# Patient Record
Sex: FEMALE | Race: WHITE | NOT HISPANIC OR LATINO | Employment: OTHER | URBAN - METROPOLITAN AREA
[De-identification: names, ages, dates, MRNs, and addresses within clinical notes are randomized per-mention and may not be internally consistent; named-entity substitution may affect disease eponyms.]

---

## 2023-04-04 LAB
CREAT ?TM UR-SCNC: 198.2 UMOL/L
EXT ALBUMIN URINE RANDOM: 17.2
HBA1C MFR BLD HPLC: 5.4 %
HCV AB SER-ACNC: NORMAL
MICROALBUMIN/CREAT UR: 9 MG/G{CREAT}

## 2023-11-14 ENCOUNTER — OFFICE VISIT (OUTPATIENT)
Dept: FAMILY MEDICINE CLINIC | Facility: CLINIC | Age: 63
End: 2023-11-14
Payer: MEDICARE

## 2023-11-14 VITALS
OXYGEN SATURATION: 96 % | DIASTOLIC BLOOD PRESSURE: 86 MMHG | TEMPERATURE: 97.5 F | HEIGHT: 64 IN | SYSTOLIC BLOOD PRESSURE: 130 MMHG | RESPIRATION RATE: 16 BRPM | BODY MASS INDEX: 30.08 KG/M2 | HEART RATE: 68 BPM | WEIGHT: 176.2 LBS

## 2023-11-14 DIAGNOSIS — F41.8 DEPRESSION WITH ANXIETY: ICD-10-CM

## 2023-11-14 DIAGNOSIS — R31.29 HEMATURIA, MICROSCOPIC: ICD-10-CM

## 2023-11-14 DIAGNOSIS — E78.5 HYPERLIPIDEMIA, UNSPECIFIED HYPERLIPIDEMIA TYPE: ICD-10-CM

## 2023-11-14 DIAGNOSIS — Z78.0 POSTMENOPAUSAL ESTROGEN DEFICIENCY: ICD-10-CM

## 2023-11-14 DIAGNOSIS — R73.01 ELEVATED FASTING GLUCOSE: ICD-10-CM

## 2023-11-14 DIAGNOSIS — I10 ESSENTIAL HYPERTENSION: Primary | ICD-10-CM

## 2023-11-14 DIAGNOSIS — M54.50 BILATERAL LOW BACK PAIN WITHOUT SCIATICA, UNSPECIFIED CHRONICITY: ICD-10-CM

## 2023-11-14 DIAGNOSIS — M25.561 RIGHT KNEE PAIN, UNSPECIFIED CHRONICITY: ICD-10-CM

## 2023-11-14 DIAGNOSIS — C50.311 MALIGNANT NEOPLASM OF LOWER-INNER QUADRANT OF RIGHT FEMALE BREAST, UNSPECIFIED ESTROGEN RECEPTOR STATUS (HCC): ICD-10-CM

## 2023-11-14 PROCEDURE — 99204 OFFICE O/P NEW MOD 45 MIN: CPT | Performed by: FAMILY MEDICINE

## 2023-11-14 RX ORDER — ESCITALOPRAM OXALATE 10 MG/1
5 TABLET ORAL DAILY
Qty: 15 TABLET
Start: 2023-11-14

## 2023-11-14 RX ORDER — OLMESARTAN MEDOXOMIL 20 MG/1
20 TABLET ORAL DAILY
Qty: 30 TABLET
Start: 2023-11-14

## 2023-11-14 RX ORDER — VALSARTAN AND HYDROCHLOROTHIAZIDE 80; 12.5 MG/1; MG/1
1 TABLET, FILM COATED ORAL DAILY
COMMUNITY
End: 2023-11-14

## 2023-11-14 RX ORDER — BACLOFEN 10 MG/1
10 TABLET ORAL
Qty: 30 TABLET | Refills: 0 | Status: SHIPPED | OUTPATIENT
Start: 2023-11-14

## 2023-11-14 RX ORDER — MULTIVITAMIN
1 TABLET ORAL DAILY
COMMUNITY

## 2023-11-14 RX ORDER — ESCITALOPRAM OXALATE 10 MG/1
10 TABLET ORAL
COMMUNITY
End: 2023-11-14 | Stop reason: SDUPTHER

## 2023-11-14 NOTE — PROGRESS NOTES
Assessment/Plan:    Essential hypertension  - well controlled, continue olmesartan  - olmesartan (BENICAR) 20 mg tablet; Take 1 tablet (20 mg total) by mouth daily  Dispense: 30 tablet  - Comprehensive metabolic panel; Future    Hyperlipidemia  - recent lab results 4/4/23 showed T Chol 210, , discussed life style changes, will recheck lipids  - Lipid Panel with Direct LDL reflex; Future    Depression with anxiety  - patient is doing well on Lexapro 10 mg daily and would like to stay on her current dose  - escitalopram (LEXAPRO) 10 mg tablet; Take 0.5 tablets (5 mg total) by mouth daily  Dispense: 15 tablet    Elevated fasting glucose  - recent A1c 5.4, discussed dietary changes, will recheck labs  - Hemoglobin A1C; Future  - Comprehensive metabolic panel; Future    Malignant neoplasm of lower-inner quadrant of right female breast  - follow up with Breast Surgery    Bilateral low back pain   - recent MRI lumbar spine 8/18/23 showed degenerative disc disease, will refer to PT for evaluation and treatment  - baclofen 10 mg tablet; Take 1 tablet (10 mg total) by mouth daily at bedtime as needed for muscle spasms  Dispense: 30 tablet; Refill: 0  - Ambulatory referral to Physical Therapy; Future    Right knee pain  - will refer to PT and Ortho  - Ambulatory Referral to Orthopedic Surgery; Future  - Ambulatory referral to Physical Therapy; Future    Hematuria, microscopic  - will recheck ua   - UA (URINE) with reflex to Scope         Return in 3 months or sooner as needed. The patient understands and agrees with the treatment plan. Subjective:   Chief Complaint   Patient presents with    [facility]      Patient ID: Jojo Bright is a 70 y.o. female with history of hypertension, hyperlipidemia, depression/ anxiety, breast cancer, s/p right mastectomy in July 2018 who presents today as a new patient to [facility], she recently moved to the area from [place].  Patient reports right knee pain, she was following with Ortho in [place] and would like to discuss a referral to establish care with [facility].          The following portions of the patient's history were reviewed and updated as appropriate: allergies, current medications, past family history, past medical history, past social history, past surgical history and problem list.    Past Medical History:   Diagnosis Date    Arthritis 2020     Past Surgical History:   Procedure Laterality Date    BREAST SURGERY  2018    Right breast     Family History   Problem Relation Age of Onset    Breast cancer Mother     Hypertension Father     Stroke Father      Social History     Socioeconomic History    Marital status: /Civil Union     Spouse name: Not on file    Number of children: Not on file    Years of education: Not on file    Highest education level: Not on file   Occupational History    Not on file   Tobacco Use    Smoking status: Never    Smokeless tobacco: Never   Vaping Use    Vaping Use: Never used   Substance and Sexual Activity    Alcohol use: Not Currently    Drug use: Never    Sexual activity: Yes     Partners: Male   Other Topics Concern    Not on file   Social History Narrative    Not on file     Social Determinants of Health     Financial Resource Strain: Not on file   Food Insecurity: Not on file   Transportation Needs: Not on file   Physical Activity: Not on file   Stress: Not on file   Social Connections: Not on file   Intimate Partner Violence: Not on file   Housing Stability: Not on file       Current Outpatient Medications:     baclofen (LIORESAL) 0.05 MG/ML, , Disp: , Rfl:     escitalopram (LEXAPRO) 10 mg tablet, Take 0.5 tablets (5 mg total) by mouth daily, Disp: 15 tablet, Rfl:     Multiple Vitamin (multivitamin) tablet, Take 1 tablet by mouth daily, Disp: , Rfl:     olmesartan (BENICAR) 20 mg tablet, Take 1 tablet (20 mg total) by mouth daily, Disp: 30 tablet, Rfl:     Review of Systems   Constitutional:  Negative for appetite change, chills, fatigue, fever and unexpected weight change. HENT:  Negative for congestion, rhinorrhea, sore throat and trouble swallowing. Eyes:  Negative for pain, discharge, redness and itching. Respiratory:  Negative for cough, shortness of breath and wheezing. Cardiovascular:  Negative for chest pain, palpitations and leg swelling. Gastrointestinal:  Negative for abdominal pain, blood in stool, constipation, diarrhea, nausea and vomiting. Genitourinary:  Negative for difficulty urinating, dysuria, flank pain, frequency, hematuria, pelvic pain and urgency. Musculoskeletal:  Positive for back pain. Right knee pain   Neurological:  Negative for dizziness, syncope, weakness, numbness and headaches. Hematological:  Negative for adenopathy. Psychiatric/Behavioral:  Negative for dysphoric mood and sleep disturbance. The patient is not nervous/anxious. Objective:    Vitals:    11/14/23 1044   BP: 130/86   Pulse: 68   Resp: 16   Temp: 97.5 °F (36.4 °C)   SpO2: 96%   Weight: 79.9 kg (176 lb 3.2 oz)   Height: 5' 4" (1.626 m)        Physical Exam  Constitutional:       General: She is not in acute distress. Appearance: She is well-developed. HENT:      Right Ear: Tympanic membrane and ear canal normal.      Left Ear: Tympanic membrane and ear canal normal.   Eyes:      Extraocular Movements: Extraocular movements intact. Pupils: Pupils are equal, round, and reactive to light. Neck:      Thyroid: No thyromegaly. Cardiovascular:      Rate and Rhythm: Normal rate and regular rhythm. Heart sounds: Normal heart sounds. No murmur heard. Pulmonary:      Effort: Pulmonary effort is normal. No respiratory distress. Breath sounds: No wheezing, rhonchi or rales. Abdominal:      General: There is no distension. Palpations: Abdomen is soft. There is no mass. Tenderness: There is no abdominal tenderness. Musculoskeletal:      Cervical back: Neck supple. Right lower leg: No edema. Left lower leg: No edema. Lymphadenopathy:      Cervical: No cervical adenopathy. Neurological:      Mental Status: She is alert and oriented to person, place, and time. Psychiatric:         Mood and Affect: Mood normal.         Behavior: Behavior normal.         Thought Content:  Thought content normal.

## 2023-11-14 NOTE — PATIENT INSTRUCTIONS
Knee Exercises   WHAT YOU NEED TO KNOW:   What do I need to know about knee exercises? Knee exercises help strengthen the muscles around your knee. Strong muscles can help reduce pain and decrease your risk of future injury. Knee exercises also help you heal after an injury or surgery. These are beginning exercises. Ask your healthcare provider if you need to see a physical therapist for more advanced exercises. What are some general guidelines for knee exercises? Start slowly. As you get stronger, you may be able to do more sets of each exercise or add weights. Stop if you feel pain. It is normal to feel some discomfort at first, but you should not feel pain. Tell your provider or physical therapist if you have pain while you exercise. Regular exercise will help decrease your discomfort over time. Do the exercises on both legs. Do this so both knees remain strong. Warm up before you do knee exercises. Walk or ride a stationary bike for 5 or 10 minutes to warm your muscles. How do I perform knee stretches safely? Always stretch before you do strengthening exercises. Do these stretching exercises again after you do the strengthening exercises. Do these stretches 4 or 5 days a week, or as directed. Standing calf stretch: Face a wall and place both palms flat on the wall, or hold the back of a chair for balance. Keep a slight bend in your knees. Take a big step backward with one leg. Keep your other leg directly under you. Keep both heels flat and press your hips forward. Hold the stretch for 30 seconds, and then relax for 30 seconds. Switch legs. Repeat 2 or 3 times on each leg. Standing quadriceps stretch:  Stand and place one hand against a wall or hold the back of a chair for balance. With your weight on one leg, bend your other leg and grab your ankle. Bring your heel toward your buttocks. Hold the stretch for 30 to 60 seconds. Switch legs. Repeat 2 or 3 times on each leg. Sitting hamstring stretch:  Sit with both legs straight in front of you. Do not point or flex your toes. Place your palms on the floor and slide your hands forward until you feel the stretch. Do not round your back. Hold the stretch for 30 seconds. Repeat 2 or 3 times. How do I perform knee strengthening exercises safely? Do these exercises 4 or 5 days a week, or as directed. Standing half squats:  Stand with your feet shoulder-width apart. Lean your back against a wall or hold the back of a chair for balance, if needed. Slowly sit down about 10 inches, as if you are going to sit in a chair. Your body weight should be mostly over your heels. Hold the squat for 5 seconds, then rise to a standing position. Do 3 sets of 10 squats to strengthen your buttocks and thighs. Standing hamstring curls: Face a wall and place both palms flat on the wall, or hold the back of a chair for balance. With your weight on one leg, lift your other foot as close to your buttocks as you can. Hold for 5 seconds and then lower your leg. Do 2 sets of 10 curls on each leg. This exercise strengthens the muscles in the back of your thigh. Standing calf raises:  Face a wall and place both palms flat on the wall, or hold the back of a chair for balance. Stand up straight, and do not lean. Place all your weight on one leg by lifting the other foot off the floor. Raise the heel of the foot that is on the floor as high as you can and then lower it. Do 2 sets of 10 calf raises on each leg to strengthen your calf muscles. Straight leg lifts:  Lie on your stomach with straight legs. Fold your arms in front of you and rest your head in your arms. Tighten your leg muscles and raise one leg as high as you can. Hold for 5 seconds, then lower your leg. Do 2 sets of 10 lifts on each leg to strengthen your buttocks. Sitting leg lifts:  Sit in a chair. Slowly straighten and raise one leg.  Squeeze your thigh muscles and hold for 5 seconds. Relax and return your foot to the floor. Do 2 sets of 10 lifts on each leg. This helps strengthen the muscles in the front of your thigh. When should I call my doctor or physical therapist?   You have new pain or your pain becomes worse. You have questions or concerns about your condition or care. CARE AGREEMENT:   You have the right to help plan your care. Learn about your health condition and how it may be treated. Discuss treatment options with your healthcare providers to decide what care you want to receive. You always have the right to refuse treatment. The above information is an  only. It is not intended as medical advice for individual conditions or treatments. Talk to your doctor, nurse or pharmacist before following any medical regimen to see if it is safe and effective for you. © Copyright [facility] 2023 Information is for End User's use only and may not be sold, redistributed or otherwise used for commercial purposes.

## 2023-12-08 ENCOUNTER — OFFICE VISIT (OUTPATIENT)
Dept: OBGYN CLINIC | Facility: CLINIC | Age: 63
End: 2023-12-08
Payer: MEDICARE

## 2023-12-08 ENCOUNTER — APPOINTMENT (OUTPATIENT)
Dept: RADIOLOGY | Facility: CLINIC | Age: 63
End: 2023-12-08
Payer: MEDICARE

## 2023-12-08 VITALS
HEIGHT: 64 IN | DIASTOLIC BLOOD PRESSURE: 79 MMHG | HEART RATE: 80 BPM | WEIGHT: 173 LBS | BODY MASS INDEX: 29.53 KG/M2 | SYSTOLIC BLOOD PRESSURE: 140 MMHG

## 2023-12-08 DIAGNOSIS — Z01.89 ENCOUNTER FOR LOWER EXTREMITY COMPARISON IMAGING STUDY: ICD-10-CM

## 2023-12-08 DIAGNOSIS — M17.31 POST-TRAUMATIC OSTEOARTHRITIS OF RIGHT KNEE: Primary | ICD-10-CM

## 2023-12-08 DIAGNOSIS — S83.241A TEAR OF MEDIAL MENISCUS OF RIGHT KNEE, CURRENT, UNSPECIFIED TEAR TYPE, INITIAL ENCOUNTER: ICD-10-CM

## 2023-12-08 DIAGNOSIS — M25.561 RIGHT KNEE PAIN, UNSPECIFIED CHRONICITY: ICD-10-CM

## 2023-12-08 PROCEDURE — 77073 BONE LENGTH STUDIES: CPT

## 2023-12-08 PROCEDURE — 20610 DRAIN/INJ JOINT/BURSA W/O US: CPT | Performed by: STUDENT IN AN ORGANIZED HEALTH CARE EDUCATION/TRAINING PROGRAM

## 2023-12-08 PROCEDURE — 73564 X-RAY EXAM KNEE 4 OR MORE: CPT

## 2023-12-08 PROCEDURE — 73560 X-RAY EXAM OF KNEE 1 OR 2: CPT

## 2023-12-08 PROCEDURE — 99204 OFFICE O/P NEW MOD 45 MIN: CPT | Performed by: STUDENT IN AN ORGANIZED HEALTH CARE EDUCATION/TRAINING PROGRAM

## 2023-12-08 RX ORDER — LIDOCAINE HYDROCHLORIDE 10 MG/ML
4 INJECTION, SOLUTION INFILTRATION; PERINEURAL
Status: COMPLETED | OUTPATIENT
Start: 2023-12-08 | End: 2023-12-08

## 2023-12-08 RX ORDER — TRIAMCINOLONE ACETONIDE 40 MG/ML
40 INJECTION, SUSPENSION INTRA-ARTICULAR; INTRAMUSCULAR
Status: COMPLETED | OUTPATIENT
Start: 2023-12-08 | End: 2023-12-08

## 2023-12-08 RX ADMIN — LIDOCAINE HYDROCHLORIDE 4 ML: 10 INJECTION, SOLUTION INFILTRATION; PERINEURAL at 09:00

## 2023-12-08 RX ADMIN — TRIAMCINOLONE ACETONIDE 40 MG: 40 INJECTION, SUSPENSION INTRA-ARTICULAR; INTRAMUSCULAR at 09:00

## 2023-12-08 NOTE — PROGRESS NOTES
Knee New Office Note    Assessment:     1. Post-traumatic osteoarthritis of right knee    2. Right knee pain, unspecified chronicity    3. Encounter for lower extremity comparison imaging study    4. Tear of medial meniscus of right knee, current, unspecified tear type, initial encounter        Plan:     Problem List Items Addressed This Visit    None  Visit Diagnoses       Post-traumatic osteoarthritis of right knee    -  Primary    Relevant Orders    Ambulatory Referral to Physical Therapy    Large joint arthrocentesis    Right knee pain, unspecified chronicity        Relevant Orders    XR knee 4+ vw right injury    XR bone length (scanogram)    Encounter for lower extremity comparison imaging study        Relevant Orders    XR knee 1 or 2 vw left    XR bone length (scanogram)    Tear of medial meniscus of right knee, current, unspecified tear type, initial encounter        Relevant Orders    Ambulatory Referral to Physical Therapy    Large joint arthrocentesis             64-year-old female presenting for evaluation of chronic right knee pain with history, clinical exam and radiographs consistent with posttraumatic osteoarthritis of the right knee. Treatment options were reviewed and discussed with the patient which include conservative and surgical modalities. Since she has not had any treatment of her right knee in the past, recommend starting with conservative measures with over-the-counter anti-inflammatories, prescription for physical therapy and right knee intra-articular corticosteroid injection. Cortisone steroid injection was administered today. Patient should avoid strenuous activities for the next 1-2 days. Patient should avoid vaccines for the next 2 weeks if possible. If patient is diabetic, should monitor glucose levels for the next 7 to 10 days. Can apply cold compress for soreness. If patient feels relief with the cortisone injections, procedure can be repeated every 3 months.  If patient sees minimal/no relief with cortisone injection, treatment with VISCO injections can be further discussed. She can follow-up as needed moving forward if her symptoms persist or fail to improve. Subjective:     Patient ID: Goldie Hill is a 70 y.o. female. Chief Complaint:  HPI:  70 y.o. female community ambulator presents for evaluation of chronic right knee pain. Patient sustained a fall onto her right knee a couple years ago. After the fall she had swelling and pain over the medial aspect of her right knee. This resolved after the injury with activity modification and over-the-counter anti-inflammatories. Over the last couple years she has had intermittent medial sided right knee pain that is worse with activity and relieved by rest.  Pain is 5 out of 10 in severity. She describes her pain as a dull achy sensation. Denies radiation or tingling distally. She has not had any treatment for her right knee pain. She had an MRI done in August of the right knee showing medial meniscus tear. Denies any mechanical blocks with knee flexion and extension.       Allergy:  Allergies   Allergen Reactions    Sulfa Antibiotics Other (See Comments)     Felt something wrong with her face     Medications:  all current active meds have been reviewed  Past Medical History:  Past Medical History:   Diagnosis Date    Arthritis 2020     Past Surgical History:  Past Surgical History:   Procedure Laterality Date    BREAST SURGERY  2018    Right breast     Family History:  Family History   Problem Relation Age of Onset    Breast cancer Mother     Hypertension Father     Stroke Father      Social History:  Social History     Substance and Sexual Activity   Alcohol Use Not Currently     Social History     Substance and Sexual Activity   Drug Use Never     Social History     Tobacco Use   Smoking Status Never   Smokeless Tobacco Never           ROS:  General: Per HPI  Skin: Negative, except if noted below  HEENT: Negative  Respiratory: Negative  Cardiovascular: Negative  Gastrointestinal: Negative  Urinary: Negative  Vascular: Negative  Musculoskeletal: Positive per HPI   Neurologic: Positive per HPI  Endocrine: Negative    Objective:  BP Readings from Last 1 Encounters:   12/08/23 140/79      Wt Readings from Last 1 Encounters:   12/08/23 78.5 kg (173 lb)        Respiratory:   non-labored respirations    Lymphatics:  no palpable lymph nodes    Gait:   Antalgic     Neurologic:   Alert and oriented times 3  Patient with normal sensation except as noted below  Deep tendon reflexes 2+ except as noted in MSK exam    Bilateral Lower Extremity:  Right knee: Inspection:  skin intact    Overall limb alignment varus    Effusion: negative    ROM 5-95 deg flexion with pain    Extensor Lag: negative    Palpation: medial Joint line tenderness to palpation    AP Stability at 90 deg stable    M/L stability in full extension stable    M/L stability in midflexion stable    Motor: 5/5 Q/HS/TA/GS/P    Pulses: 2+ DP / 2+ PT    SILT DP/SP/S/S/TN    Imaging:  My interpretation XR AP scanogram/AP bilateral knee/lateral/lopez/sunrise right knee: moderate medial joint space narrowing, subchondral sclerosis, subchondral cysts, osteophyte formation. No fracture or dislocation. MRI right knee shows medial meniscus tear     Large joint arthrocentesis: R knee  Universal Protocol:  Consent: Verbal consent obtained.   Risks and benefits: risks, benefits and alternatives were discussed  Consent given by: patient  Patient identity confirmed: verbally with patient  Supporting Documentation  Indications: pain   Procedure Details  Location: knee - R knee  Preparation: Patient was prepped and draped in the usual sterile fashion  Needle size: 22 G  Ultrasound guidance: no  Approach: anterolateral  Medications administered: 40 mg triamcinolone acetonide 40 mg/mL; 4 mL lidocaine 1 %    Patient tolerance: patient tolerated the procedure well with no immediate complications  Dressing:  Sterile dressing applied          BMI:   Estimated body mass index is 29.7 kg/m² as calculated from the following:    Height as of this encounter: 5' 4" (1.626 m). Weight as of this encounter: 78.5 kg (173 lb). BSA:   Estimated body surface area is 1.84 meters squared as calculated from the following:    Height as of this encounter: 5' 4" (1.626 m). Weight as of this encounter: 78.5 kg (173 lb).

## 2023-12-14 ENCOUNTER — EVALUATION (OUTPATIENT)
Dept: PHYSICAL THERAPY | Facility: CLINIC | Age: 63
End: 2023-12-14
Payer: MEDICARE

## 2023-12-14 DIAGNOSIS — S83.241D TEAR OF MEDIAL MENISCUS OF RIGHT KNEE, CURRENT, UNSPECIFIED TEAR TYPE, SUBSEQUENT ENCOUNTER: ICD-10-CM

## 2023-12-14 DIAGNOSIS — M17.31 POST-TRAUMATIC OSTEOARTHRITIS OF RIGHT KNEE: ICD-10-CM

## 2023-12-14 PROCEDURE — 97110 THERAPEUTIC EXERCISES: CPT | Performed by: PHYSICAL THERAPIST

## 2023-12-14 PROCEDURE — 97162 PT EVAL MOD COMPLEX 30 MIN: CPT | Performed by: PHYSICAL THERAPIST

## 2023-12-14 NOTE — PROGRESS NOTES
PT Evaluation     Today's date: 2023  Patient name: Gladys Montalvo  : 1960  MRN: [id number]  Referring provider: Bunny Rueda, *  Dx:   Encounter Diagnosis     ICD-10-CM    1. Post-traumatic osteoarthritis of right knee  M17.31 Ambulatory Referral to Physical Therapy      2. Tear of medial meniscus of right knee, current, unspecified tear type, subsequent encounter  S83.434D Ambulatory Referral to Physical Therapy                     Assessment  Assessment details: Gladys Montalvo is a 70 y.o. female presenting to outpatient physical therapy with chief complaints of (R) knee pain. Patient describes insidious onset of pain in May which has impacted her ability to ambulate normally. Patient displays with abnormal gait, restricted (R) knee mobility, poor functional strength, significant (B) hip strength deficits, low confidence with functional tasks, and functional restrictions. Patient's symptoms are multifactorial in nature with a primary movement diagnosis of poor motor control resulting in pathoanatomical signs and symptoms consistent with Post-traumatic osteoarthritis of right knee, Tear of medial meniscus of right knee, current, unspecified tear type, subsequent encounter resulting in limitations in her ability to ambulate safely and perform daily tasks without thinking about her knee. Please contact me if you have any questions ([phone]). Thank you for the opportunity to share in the care of this patient. Impairments: abnormal gait, abnormal or restricted ROM, activity intolerance, impaired balance, impaired physical strength, lacks appropriate home exercise program and pain with function    Symptom irritability: highUnderstanding of Dx/Px/POC: good   Prognosis: fair  Prognosis details: Positive prognostic indicators include positive attitude toward recovery, motivated to improve, good understanding of condition, realistic expectations.   Negative prognostic indicators include chronicity of symptoms, high symptom irritability. Goals  STG to be met in 4 weeks:  - Increase (R) Knee AROM: 0-125 degrees. - Increase (B) Hip strength by 1/2 MMT grade. - Improve SL balance to 5 seconds. - I with HEP.  - Patient will be able to walk 1/8 mile without pain. LTG to be met in 8 weeks:  - Increase (R) Knee AROM: 0-130 degrees. - Increase (R) Hip strength to 4+/5 all planes. - Improve SL balance to 15 seconds. - I with updated HEP.  - Patient will be able to perform all ADLs without thinking about her knee. Plan  Plan details: Prognosis above is given PT services 2x/week tapering to 1x/week over the next 8 weeks and home program adherence. Patient would benefit from: skilled physical therapy  Planned modality interventions: cryotherapy and thermotherapy: hydrocollator packs  Planned therapy interventions: joint mobilization, manual therapy, neuromuscular re-education, patient education, strengthening, stretching, therapeutic activities, therapeutic exercise, home exercise program, body mechanics training, activity modification, functional ROM exercises, gait training and balance  Plan of Care beginning date: 12/14/2023  Plan of Care expiration date: 2/8/2024  Treatment plan discussed with: patient        Subjective Evaluation    History of Present Illness  Mechanism of injury: At Evaluation (December 14, 2023): Patient reports insidious onset of (R) sided lower back and (R) knee pain in May. She was attending PT in [facility] for her lower back - her strength improved but pain persisted. She underwent lumbar and (R) knee MRIs which showed fraying of the meniscus in the (R) knee. She recently moved to this area - established care with Dr. Otoniel Molina - recommended cortisone injection and PT.      Red Flag Screen:  Patient denies recent fever, changes in bowel and bladder function, nausea and vomiting, weakness, unexplained weight loss, tingling, numbness, pain with coughing, or traumatic LILIANA. Greatest concern: lack of mobility     Quality of life: good    Patient Goals  Patient goal: Patient Specific Functional Scale: return to walking 1/4 without compensations 0/10, able to negotiate curbs without pain 0/10, function without worrying about the knee 0/10; Total 0/30  Pain  Current pain ratin  At best pain ratin  At worst pain ratin  Location: (R) knee - anterior and lateral  Quality: pressure, dull ache and sharp  Alleviating factors: Activity modification, gentle movement. Exacerbated by: walking longer distances, weather changes, walking on uneven surfaces, step wthout handrail. Social Support  Steps to enter house: no  Stairs in house: no   [place] [facility]  Lives with: spouse    Employment status: not working    Diagnostic Tests  X-ray: normal  MRI studies: abnormal  Treatments  Previous treatment: physical therapy  Current treatment: injection treatment        Objective     Static Posture   General Observations  Asymmetrical weight bearing and shifted left. Palpation     Additional Palpation Details  TTP lateral (R) joint line    Active Range of Motion   Left Knee   Flexion: 130 degrees   Extension: 0 degrees     Right Knee   Flexion: 120 degrees   Extension: 0 degrees     Strength/Myotome Testing     Left Hip   Planes of Motion   Extension: 3  Abduction: 3    Right Hip   Planes of Motion   Extension: 3  Abduction: 3    Left Knee   Flexion: 5  Extension: 5    Right Knee   Flexion: 4-  Extension: 4+    Tests     Left Knee   Negative anterior drawer, posterior drawer, valgus stress test at 0 degrees, valgus stress test at 30 degrees, varus stress test at 0 degrees and varus stress test at 30 degrees. Right Knee   Negative anterior drawer, lateral Evin, medial Evin, posterior drawer, valgus stress test at 0 degrees, valgus stress test at 30 degrees, varus stress test at 0 degrees and varus stress test at 30 degrees.      Ambulation Observational Gait   Gait: antalgic   Decreased walking speed, right stance time and right step length. Right foot contact pattern: foot flat    Functional Assessment        Comments  Squat: poor hip control and depth             Daily Treatment Diary     DX: (R) Knee OA - meniscus tear  EPOC: 2/8/24  Follow Up with Referring Provider:   Precautions: NA  CO-MORBIDITIES: HTN  HEP ACCESS CODE: [id number]     Stage: chronic   Stability of Symptoms:  At Evaluation: stable - unchanged  Global Rating of Change:   Symptom Irritability Level: high  Primary Movement Diagnosis: poor motor control  Primary Pain Phenotype: nociplastic  Patient Specific Functional Scale:   12/14/23: return to walking 1/4 without compensations 0/10, able to negotiate curbs without pain 0/10, function without worrying about the knee 0/10;  Total 0/30   FOTO Prediction: 64 by visit 12  FOTO Progress: 50 at evaluation   Greatest Concern: lack of mobility  Current Activity Plan: added to HEP (12/14)  Current Educational Needs: progressions, confidence      Treatment Diary          Manual Therapy         (R) Knee PROM                                                               Therapeutic Exercise  HEP         Recumbent Bike          Elliptical                     SLR 12/14         S/L Hip ABD 12/14         Bridge 12/14                   LAQ                                        Neuromuscular Reeducation          Foam Side Stepping                    FWD Mini Lunge          LAT Mini Lunge          Mini Squat                    FWD Step Up          LAT Step Up                    TB Counter Balance                    Therapeutic Activity                              Gait Training                                        Modalities

## 2023-12-21 ENCOUNTER — OFFICE VISIT (OUTPATIENT)
Dept: PHYSICAL THERAPY | Facility: CLINIC | Age: 63
End: 2023-12-21
Payer: MEDICARE

## 2023-12-21 DIAGNOSIS — S83.241D TEAR OF MEDIAL MENISCUS OF RIGHT KNEE, CURRENT, UNSPECIFIED TEAR TYPE, SUBSEQUENT ENCOUNTER: ICD-10-CM

## 2023-12-21 DIAGNOSIS — M17.31 POST-TRAUMATIC OSTEOARTHRITIS OF RIGHT KNEE: Primary | ICD-10-CM

## 2023-12-21 PROCEDURE — 97110 THERAPEUTIC EXERCISES: CPT | Performed by: PHYSICAL THERAPIST

## 2023-12-21 PROCEDURE — 97112 NEUROMUSCULAR REEDUCATION: CPT | Performed by: PHYSICAL THERAPIST

## 2023-12-21 NOTE — PROGRESS NOTES
Daily Note     Today's date: 2023  Patient name: Analisa Reyez  : 1960  MRN: [id number]  Referring provider: Faizan Garcia, *  Dx:   Encounter Diagnosis     ICD-10-CM    1. Post-traumatic osteoarthritis of right knee  M17.31       2. Tear of medial meniscus of right knee, current, unspecified tear type, subsequent encounter  S83.245Z                      Subjective: Patient reports no adverse reaction to her IE.  HEP is going well - she notes she is having some increased muscular soreness in her hip.          Objective: See treatment diary below      Assessment:   Stage: chronic   Stability of Symptoms:  At Evaluation: stable - unchanged  Global Rating of Change:   Symptom Irritability Level: high  Primary Movement Diagnosis: poor motor control  Primary Pain Phenotype: nociplastic  Patient Specific Functional Scale:   23: return to walking 1/4 without compensations 0/10, able to negotiate curbs without pain 0/10, function without worrying about the knee 0/10; Total 0/30   FOTO Prediction: 64 by visit 12  FOTO Progress: 50 at evaluation   Greatest Concern: lack of mobility  Current Activity Plan: added to HEP ()  Current Educational Needs: progressions, confidence    Patient had good tolerance to manual treatment - knee mobility improved following.  She demonstrated good exercise form throughout treatment.  She was encouraged to not only identify problem areas but also identify things that are going well or ways that she is making progress with things.  She was able to walk with step through pattern but remains hesitant to do so without being asked. Skilled PT continues to be required to guide progression of knee and hip control and strength to allow her to have more confidence with walking and negotiating steps.      Plan: Continue with POC - monitor tolerance to treatment - progress as able NV       Daily Treatment Diary     DX: (R) Knee OA - meniscus tear  EPOC:  "2/8/24  Follow Up with Referring Provider:   Precautions: NA  CO-MORBIDITIES: HTN  HEP ACCESS CODE: [id number]       Treatment Diary  12/21        Manual Therapy         (R) Knee PROM Seated  8 min                                                              Therapeutic Exercise  HEP         Recumbent Bike  6 min        Elliptical                     SLR 12/14 15x ea        S/L Hip ABD 12/14         Bridge 12/14 5\"x20                  LAQ  2# 15x ea                                      Neuromuscular Reeducation          Foam Side Stepping  6 Feet  3 laps                  FWD Mini Lunge  15x ea        LAT Mini Lunge          Mini Squat                    FWD Step Up          LAT Step Up                    TB Counter Balance                    Therapeutic Activity                              Gait Training                                        Modalities                                      "

## 2023-12-26 ENCOUNTER — OFFICE VISIT (OUTPATIENT)
Dept: PHYSICAL THERAPY | Facility: CLINIC | Age: 63
End: 2023-12-26
Payer: MEDICARE

## 2023-12-26 DIAGNOSIS — S83.241D TEAR OF MEDIAL MENISCUS OF RIGHT KNEE, CURRENT, UNSPECIFIED TEAR TYPE, SUBSEQUENT ENCOUNTER: ICD-10-CM

## 2023-12-26 DIAGNOSIS — M17.31 POST-TRAUMATIC OSTEOARTHRITIS OF RIGHT KNEE: Primary | ICD-10-CM

## 2023-12-26 PROCEDURE — 97110 THERAPEUTIC EXERCISES: CPT | Performed by: PHYSICAL THERAPIST

## 2023-12-26 PROCEDURE — 97112 NEUROMUSCULAR REEDUCATION: CPT | Performed by: PHYSICAL THERAPIST

## 2023-12-26 NOTE — PROGRESS NOTES
Daily Note     Today's date: 2023  Patient name: Analisa Reyez  : 1960  MRN: [id number]  Referring provider: Faizan Garcia, *  Dx:   Encounter Diagnosis     ICD-10-CM    1. Post-traumatic osteoarthritis of right knee  M17.31       2. Tear of medial meniscus of right knee, current, unspecified tear type, subsequent encounter  S83.024U                      Subjective: Patient reports good tolerance to her LV.  She notes she is experiencing (R) hip clicking with walking.  Today she reports she is very tired from the holiday.            Objective: See treatment diary below      Assessment:   Stage: chronic   Stability of Symptoms:  At Evaluation: stable - unchanged  Global Rating of Change:   Symptom Irritability Level: high  Primary Movement Diagnosis: poor motor control  Primary Pain Phenotype: nociplastic  Patient Specific Functional Scale:   23: return to walking 1/4 without compensations 0/10, able to negotiate curbs without pain 0/10, function without worrying about the knee 0/10; Total 0/30   FOTO Prediction: 64 by visit 12  FOTO Progress: 50 at evaluation   Greatest Concern: lack of mobility  Current Activity Plan: added to HEP ()  Current Educational Needs: progressions, confidence    Patient continues to respond well to manual treatment.  She was fatigued with lunges but noted no increased pain throughout treatment.  She appeared more confident with ambulation post treatment.  Skilled PT continues to be required to guide progression of knee and hip control and strength to allow her to have more confidence with walking and negotiating steps.      Plan: Continue with POC - monitor tolerance to treatment - progress as able NV       Daily Treatment Diary     DX: (R) Knee OA - meniscus tear  EPOC: 24  Follow Up with Referring Provider:   Precautions: NA  CO-MORBIDITIES: HTN  HEP ACCESS CODE: [id number]       Treatment Diary         Manual Therapy         (R) Knee  "PROM Seated  8 min Seated  8 min                                                             Therapeutic Exercise  HEP         Recumbent Bike  6 min 6 min       Elliptical                     SLR 12/14 15x ea 20x ea       S/L Hip ABD 12/14  10x ea       Bridge 12/14 5\"x20 5\"x20                 LAQ  2# 15x ea 3# 20x ea                                     Neuromuscular Reeducation          Foam Side Stepping  6 Feet  3 laps                  FWD Mini Lunge  15x ea 15x ea       LAT Mini Lunge   15x ea       Mini Squat   20x                 FWD Step Up          LAT Step Up                    TB Counter Balance                    Therapeutic Activity                              Gait Training                                        Modalities                                      "

## 2023-12-28 ENCOUNTER — OFFICE VISIT (OUTPATIENT)
Dept: PHYSICAL THERAPY | Facility: CLINIC | Age: 63
End: 2023-12-28
Payer: MEDICARE

## 2023-12-28 DIAGNOSIS — M17.31 POST-TRAUMATIC OSTEOARTHRITIS OF RIGHT KNEE: Primary | ICD-10-CM

## 2023-12-28 DIAGNOSIS — S83.241D TEAR OF MEDIAL MENISCUS OF RIGHT KNEE, CURRENT, UNSPECIFIED TEAR TYPE, SUBSEQUENT ENCOUNTER: ICD-10-CM

## 2023-12-28 PROCEDURE — 97112 NEUROMUSCULAR REEDUCATION: CPT | Performed by: PHYSICAL THERAPIST

## 2023-12-28 PROCEDURE — 97110 THERAPEUTIC EXERCISES: CPT | Performed by: PHYSICAL THERAPIST

## 2023-12-28 NOTE — PROGRESS NOTES
Daily Note     Today's date: 2023  Patient name: Analisa Reyez  : 1960  MRN: [id number]  Referring provider: Faizan Garcia, *  Dx:   Encounter Diagnosis     ICD-10-CM    1. Post-traumatic osteoarthritis of right knee  M17.31       2. Tear of medial meniscus of right knee, current, unspecified tear type, subsequent encounter  S83.453R                      Subjective: Patient reports no adverse reaction to her LV - felt very good that night and the next day.  She reports some hip and knee discomfort this morning.        Objective: See treatment diary below      Assessment:   Stage: chronic   Stability of Symptoms:  At Evaluation: stable - unchanged  Global Rating of Change:   Symptom Irritability Level: high  Primary Movement Diagnosis: poor motor control  Primary Pain Phenotype: nociplastic  Patient Specific Functional Scale:   23: return to walking 1/4 without compensations 0/10, able to negotiate curbs without pain 0/10, function without worrying about the knee 0/10; Total 0/30   FOTO Prediction: 64 by visit 12  FOTO Progress: 50 at evaluation   Greatest Concern: lack of mobility  Current Activity Plan: added to HEP ()  Current Educational Needs: progressions, confidence    Patient continues to respond well to manual treatment - ROM is improving well.  She demonstrated improved form with lunges today.  She was fatigued post treatment but noted no increased pain.  Skilled PT continues to be required to guide progression of knee and hip control and strength to allow her to have more confidence with walking and negotiating steps.      Plan: Continue with POC - monitor tolerance to treatment - progress as able NV       Daily Treatment Diary     DX: (R) Knee OA - meniscus tear  EPOC: 24  Follow Up with Referring Provider:   Precautions: NA  CO-MORBIDITIES: HTN  HEP ACCESS CODE: [id number]       Treatment Diary        Manual Therapy         (R) Knee PROM Seated  8  "min Seated  8 min Seated  8 min                                                            Therapeutic Exercise  HEP         Recumbent Bike  6 min 6 min 6 min      Elliptical                     SLR 12/14 15x ea 20x ea 20x ea      S/L Hip ABD 12/14  10x ea 20x ea      Bridge 12/14 5\"x20 5\"x20 5\"x20                LAQ  2# 15x ea 3# 20x ea 4# 20x ea                                    Neuromuscular Reeducation          Foam Side Stepping  6 Feet  3 laps                  FWD Mini Lunge  15x ea 15x ea 15x ea      LAT Mini Lunge   15x ea 15x ea      Mini Squat   20x 20x                FWD Step Up          LAT Step Up                    TB Counter Balance                    Therapeutic Activity                              Gait Training                                        Modalities                                      "

## 2024-01-02 ENCOUNTER — OFFICE VISIT (OUTPATIENT)
Dept: PHYSICAL THERAPY | Facility: CLINIC | Age: 64
End: 2024-01-02
Payer: MEDICARE

## 2024-01-02 DIAGNOSIS — M17.31 POST-TRAUMATIC OSTEOARTHRITIS OF RIGHT KNEE: Primary | ICD-10-CM

## 2024-01-02 DIAGNOSIS — S83.241D TEAR OF MEDIAL MENISCUS OF RIGHT KNEE, CURRENT, UNSPECIFIED TEAR TYPE, SUBSEQUENT ENCOUNTER: ICD-10-CM

## 2024-01-02 PROCEDURE — 97110 THERAPEUTIC EXERCISES: CPT | Performed by: PHYSICAL THERAPIST

## 2024-01-02 PROCEDURE — 97112 NEUROMUSCULAR REEDUCATION: CPT | Performed by: PHYSICAL THERAPIST

## 2024-01-02 NOTE — PROGRESS NOTES
Daily Note     Today's date: 2024  Patient name: Analisa Reyez  : 1960  MRN: [id number]  Referring provider: Faizan Garcia, *  Dx:   Encounter Diagnosis     ICD-10-CM    1. Post-traumatic osteoarthritis of right knee  M17.31       2. Tear of medial meniscus of right knee, current, unspecified tear type, subsequent encounter  S83.097F                      Subjective: Patient reports good tolerance to her LV.  She notes she is feeling a little off today with equilibrium.  She notes her HEP is helping her feel better - yesterday she did a lot of house work instead of HEP.        Objective: See treatment diary below      Assessment:   Stage: chronic   Stability of Symptoms:  At Evaluation: stable - unchanged  Global Rating of Change:   Symptom Irritability Level: high  Primary Movement Diagnosis: poor motor control  Primary Pain Phenotype: nociplastic  Patient Specific Functional Scale:   23: return to walking 1/4 without compensations 0/10, able to negotiate curbs without pain 0/10, function without worrying about the knee 0/10; Total 0/30   FOTO Prediction: 64 by visit 12  FOTO Progress: 50 at evaluation   Greatest Concern: lack of mobility  Current Activity Plan: added to HEP ()  Current Educational Needs: progressions, confidence    Patient continues to have good tolerance to manual treatment.  She demonstrated good form and confidence with exercises today.  She continues to make slow progress with PT.  Skilled PT continues to be required to guide progression of knee and hip control and strength to allow her to have more confidence with walking and negotiating steps.      Plan: Continue with POC - monitor tolerance to treatment - progress as able NV       Daily Treatment Diary     DX: (R) Knee OA - meniscus tear  EPOC: 24  Follow Up with Referring Provider:   Precautions: NA  CO-MORBIDITIES: HTN  HEP ACCESS CODE: [id number]       Treatment Diary      "  Manual Therapy         (R) Knee PROM Seated  8 min Seated  8 min Seated  8 min Seated   8 min                                                           Therapeutic Exercise  HEP         Recumbent Bike  6 min 6 min 6 min 6 min     Elliptical                     SLR 12/14 15x ea 20x ea 20x ea 20x ea     S/L Hip ABD 12/14  10x ea 20x ea 20x ea     Bridge 12/14 5\"x20 5\"x20 5\"x20 5\"x20               LAQ  2# 15x ea 3# 20x ea 4# 20x ea 5# 20x ea                                   Neuromuscular Reeducation          Foam Side Stepping  6 Feet  3 laps                  FWD Mini Lunge  15x ea 15x ea 15x ea 15x ea     LAT Mini Lunge   15x ea 15x ea 15x ea     Mini Squat   20x 20x 20x               FWD Step Up          LAT Step Up                    TB Counter Balance                    Therapeutic Activity                              Gait Training                                        Modalities                                      "

## 2024-01-05 ENCOUNTER — APPOINTMENT (OUTPATIENT)
Dept: PHYSICAL THERAPY | Facility: CLINIC | Age: 64
End: 2024-01-05
Payer: MEDICARE

## 2024-01-10 ENCOUNTER — OFFICE VISIT (OUTPATIENT)
Dept: PHYSICAL THERAPY | Facility: CLINIC | Age: 64
End: 2024-01-10
Payer: MEDICARE

## 2024-01-10 DIAGNOSIS — S83.241D TEAR OF MEDIAL MENISCUS OF RIGHT KNEE, CURRENT, UNSPECIFIED TEAR TYPE, SUBSEQUENT ENCOUNTER: ICD-10-CM

## 2024-01-10 DIAGNOSIS — M17.31 POST-TRAUMATIC OSTEOARTHRITIS OF RIGHT KNEE: Primary | ICD-10-CM

## 2024-01-10 PROCEDURE — 97112 NEUROMUSCULAR REEDUCATION: CPT | Performed by: PHYSICAL THERAPIST

## 2024-01-10 PROCEDURE — 97110 THERAPEUTIC EXERCISES: CPT | Performed by: PHYSICAL THERAPIST

## 2024-01-10 NOTE — PROGRESS NOTES
Daily Note     Today's date: 1/10/2024  Patient name: Analisa Reyez  : 1960  MRN: [id number]  Referring provider: Faizan Garcia, *  Dx:   Encounter Diagnosis     ICD-10-CM    1. Post-traumatic osteoarthritis of right knee  M17.31       2. Tear of medial meniscus of right knee, current, unspecified tear type, subsequent encounter  S83.495U                      Subjective: Patient reports good tolerance to her LV.   She reports she is still having difficulty with negotiating curbs.        Objective: See treatment diary below      Assessment:   Stage: chronic   Stability of Symptoms:  At Evaluation: stable - unchanged  Global Rating of Change:   Symptom Irritability Level: high  Primary Movement Diagnosis: poor motor control  Primary Pain Phenotype: nociplastic  Patient Specific Functional Scale:   23: return to walking 1/4 without compensations 0/10, able to negotiate curbs without pain 0/10, function without worrying about the knee 0/10; Total 0/30   FOTO Prediction: 64 by visit 12  FOTO Progress: 50 at evaluation   Greatest Concern: lack of mobility  Current Activity Plan: added to HEP ()  Current Educational Needs: progressions, confidence    Patient continues to respond well to manual treatment.  She is making progress with confidence in her knee.  She was able to progress to step ups with good form.  She is making steady progress with PT.  She noted fatigue but no pain post treatment.  Skilled PT continues to be required to guide progression of knee and hip control and strength to allow her to have more confidence with walking and negotiating steps.      Plan: Continue with POC - monitor tolerance to treatment - progress as able NV. Add dual task exercises to assess response       Daily Treatment Diary     DX: (R) Knee OA - meniscus tear  EPOC: 24  Follow Up with Referring Provider:   Precautions: NA  CO-MORBIDITIES: HTN  HEP ACCESS CODE: [id number]       Treatment Diary    "12/26 12/28 1/2 1/10    Manual Therapy         (R) Knee PROM Seated  8 min Seated  8 min Seated  8 min Seated   8 min Seated   8 min                                                          Therapeutic Exercise  HEP         Recumbent Bike  6 min 6 min 6 min 6 min 6 min    Elliptical                     SLR 12/14 15x ea 20x ea 20x ea 20x ea 15x ea    S/L Hip ABD 12/14  10x ea 20x ea 20x ea 15x ea    Bridge 12/14 5\"x20 5\"x20 5\"x20 5\"x20 5\"x20              LAQ  2# 15x ea 3# 20x ea 4# 20x ea 5# 20x ea                                   Neuromuscular Reeducation          Foam Side Stepping  6 Feet  3 laps                  FWD Mini Lunge  15x ea 15x ea 15x ea 15x ea     LAT Mini Lunge   15x ea 15x ea 15x ea     Mini Squat   20x 20x 20x 15x              FWD Step Up      6\" Step  10x    LAT Step Up                    TB Counter Balance                    Therapeutic Activity                              Gait Training                                        Modalities                                      "

## 2024-01-12 ENCOUNTER — APPOINTMENT (OUTPATIENT)
Dept: PHYSICAL THERAPY | Facility: CLINIC | Age: 64
End: 2024-01-12
Payer: MEDICARE

## 2024-01-15 ENCOUNTER — APPOINTMENT (OUTPATIENT)
Dept: PHYSICAL THERAPY | Facility: CLINIC | Age: 64
End: 2024-01-15
Payer: MEDICARE

## 2024-01-18 ENCOUNTER — APPOINTMENT (OUTPATIENT)
Dept: PHYSICAL THERAPY | Facility: CLINIC | Age: 64
End: 2024-01-18
Payer: MEDICARE

## 2024-01-22 ENCOUNTER — OFFICE VISIT (OUTPATIENT)
Dept: PHYSICAL THERAPY | Facility: CLINIC | Age: 64
End: 2024-01-22
Payer: MEDICARE

## 2024-01-22 DIAGNOSIS — M17.31 POST-TRAUMATIC OSTEOARTHRITIS OF RIGHT KNEE: Primary | ICD-10-CM

## 2024-01-22 DIAGNOSIS — S83.241D TEAR OF MEDIAL MENISCUS OF RIGHT KNEE, CURRENT, UNSPECIFIED TEAR TYPE, SUBSEQUENT ENCOUNTER: ICD-10-CM

## 2024-01-22 PROCEDURE — 97116 GAIT TRAINING THERAPY: CPT

## 2024-01-22 PROCEDURE — 97140 MANUAL THERAPY 1/> REGIONS: CPT

## 2024-01-22 PROCEDURE — 97110 THERAPEUTIC EXERCISES: CPT

## 2024-01-22 NOTE — PROGRESS NOTES
Daily Note     Today's date: 2024  Patient name: Analisa Reyez  : 1960  MRN: [id number]  Referring provider: Faizan Garcia, *  Dx:   Encounter Diagnosis     ICD-10-CM    1. Post-traumatic osteoarthritis of right knee  M17.31       2. Tear of medial meniscus of right knee, current, unspecified tear type, subsequent encounter  S83.325P                      Subjective: Pt reports she is feeling better in general coming off antibiotics for a cold.  Pt reports she has been investigating her knee issue while not using it and decided her R ankle does not move as much as her L.      Objective: See treatment diary below      Stage: chronic   Stability of Symptoms:  At Evaluation: stable - unchanged  Global Rating of Change:   Symptom Irritability Level: high  Primary Movement Diagnosis: poor motor control  Primary Pain Phenotype: nociplastic  Patient Specific Functional Scale:   23: return to walking 1/4 without compensations 0/10, able to negotiate curbs without pain 0/10, function without worrying about the knee 0/10; Total 0/30   FOTO Prediction: 64 by visit 12  FOTO Progress: 50 at evaluation   Greatest Concern: lack of mobility  Current Activity Plan: added to HEP ()  Current Educational Needs: progressions, confidence    Pt cont's to lock out her knee w/ gait.  Attempted to work on knee flex w/ swing through of gait.  Pt had difficulty over thinking process.  Pt admits her poor gait may be a habit rather than pain influencing her.  Pt would benefit from cont'd PT for work on knee AROM and strengthening.  Pt given trial of ktape for support to the knee and to facilitate ant tib for ankle AROM.  Pt demonstrates fear mostly lacking knee flexion due to pt is able to flex knee to good range.      Plan: Continue with POC - monitor tolerance to treatment - progress as able NV. Add dual task exercises to assess response       Daily Treatment Diary     DX: (R) Knee OA - meniscus tear  EPOC:  "2/8/24  Follow Up with Referring Provider:   Precautions: NA  CO-MORBIDITIES: HTN  HEP ACCESS CODE: [email or web address]     Treatment Diary  12/21 12/26 12/28 1/2 1/10 1/22   Manual Therapy         (R) Knee PROM Seated  8 min Seated  8 min Seated  8 min Seated   8 min Seated   8 min Supine 10'                                                         Therapeutic Exercise  HEP         Recumbent Bike  6 min 6 min 6 min 6 min 6 min 6 min   Elliptical                     SLR 12/14 15x ea 20x ea 20x ea 20x ea 15x ea 15x ea   S/L Hip ABD 12/14  10x ea 20x ea 20x ea 15x ea 15x    Bridge 12/14 5\"x20 5\"x20 5\"x20 5\"x20 5\"x20 5\"x20              LAQ  2# 15x ea 3# 20x ea 4# 20x ea 5# 20x ea                                   Neuromuscular Reeducation          Foam Side Stepping  6 Feet  3 laps                  FWD Mini Lunge  15x ea 15x ea 15x ea 15x ea  15 ea   LAT Mini Lunge   15x ea 15x ea 15x ea  15 ea   Mini Squat   20x 20x 20x 15x 20x             FWD Step Up      6\" Step  10x 6\" Step  10x   LAT Step Up                    TB Counter Balance                    Therapeutic Activity                              Gait Training          Step  thru w/ toe off/knee flex       x10                       Modalities                                        "

## 2024-01-24 ENCOUNTER — APPOINTMENT (OUTPATIENT)
Dept: PHYSICAL THERAPY | Facility: CLINIC | Age: 64
End: 2024-01-24
Payer: MEDICARE

## 2024-01-29 ENCOUNTER — OFFICE VISIT (OUTPATIENT)
Dept: PHYSICAL THERAPY | Facility: CLINIC | Age: 64
End: 2024-01-29
Payer: MEDICARE

## 2024-01-29 DIAGNOSIS — M17.31 POST-TRAUMATIC OSTEOARTHRITIS OF RIGHT KNEE: Primary | ICD-10-CM

## 2024-01-29 DIAGNOSIS — S83.241D TEAR OF MEDIAL MENISCUS OF RIGHT KNEE, CURRENT, UNSPECIFIED TEAR TYPE, SUBSEQUENT ENCOUNTER: ICD-10-CM

## 2024-01-29 PROCEDURE — 97110 THERAPEUTIC EXERCISES: CPT | Performed by: PHYSICAL THERAPIST

## 2024-01-29 PROCEDURE — 97112 NEUROMUSCULAR REEDUCATION: CPT | Performed by: PHYSICAL THERAPIST

## 2024-01-29 PROCEDURE — 97140 MANUAL THERAPY 1/> REGIONS: CPT | Performed by: PHYSICAL THERAPIST

## 2024-01-29 NOTE — PROGRESS NOTES
Daily Note     Today's date: 2024  Patient name: Analisa Reyez  : 1960  MRN: [id number]  Referring provider: Faizan Garcia, *  Dx:   Encounter Diagnosis     ICD-10-CM    1. Post-traumatic osteoarthritis of right knee  M17.31       2. Tear of medial meniscus of right knee, current, unspecified tear type, subsequent encounter  S83.674D                        Subjective: Patient reports she has been sick - sinus infection.  She has not been as active and notes she feels her strength is impacted in a negative way.        Objective: See treatment diary below      Stage: chronic   Stability of Symptoms:  At Evaluation: stable - unchanged  Global Rating of Change:   Symptom Irritability Level: high  Primary Movement Diagnosis: poor motor control  Primary Pain Phenotype: nociplastic  Patient Specific Functional Scale:   23: return to walking 1/4 without compensations 0/10, able to negotiate curbs without pain 0/10, function without worrying about the knee 0/10; Total 0/30   FOTO Prediction: 64 by visit 12  FOTO Progress: 50 at evaluation   Greatest Concern: lack of mobility  Current Activity Plan: added to HEP ()  Current Educational Needs: progressions, confidence    Patient had good tolerance to manual treatment.  She demonstrated good (R) ankle DF with knee bent but significant limitation with knee extended - indicating gastroc restriction.  Walking with dual task went well - did not have as many pauses but still walked with stiff knee.  Added gastroc stretch to HEP.  She noted fatigue but no increased pain post treatment. Skilled PT continues to be required to guide progression of knee and hip control and strength to allow her to have more confidence with walking and negotiating steps.         Plan: Continue with POC - monitor tolerance to treatment - progress as able NV. Add dual task exercises to assess response       Daily Treatment Diary     DX: (R) Knee OA - meniscus  "tear  EPOC: 2/8/24  Follow Up with Referring Provider:   Precautions: NA  CO-MORBIDITIES: HTN  HEP ACCESS CODE: [email or web address]     Treatment Diary  12/28 1/2 1/10 1/22 1/29    Manual Therapy         (R) Knee PROM Seated  8 min Seated   8 min Seated   8 min Supine 10' Seated  8 min                                                          Therapeutic Exercise  HEP         Recumbent Bike  6 min 6 min 6 min 6 min 6 min    Elliptical                     SLR 12/14 20x ea 20x ea 15x ea 15x ea 20x ea    S/L Hip ABD 12/14 20x ea 20x ea 15x ea 15x  20x ea    Bridge 12/14 5\"x20 5\"x20 5\"x20 5\"x20  5\"x20              LAQ  4# 20x ea 5# 20x ea                                     Neuromuscular Reeducation          Foam Side Stepping                    FWD Mini Lunge  15x ea 15x ea  15 ea     LAT Mini Lunge  15x ea 15x ea  15 ea     Mini Squat  20x 20x 15x 20x 20x              FWD Step Up    6\" Step  10x 6\" Step  10x     LAT Step Up                    TB Counter Balance                    Therapeutic Activity                              Gait Training          Step  thru w/ toe off/knee flex     x10     Ambulation with dual task      Count to 50 by 1  30 feet              Modalities                                        "

## 2024-01-31 ENCOUNTER — OFFICE VISIT (OUTPATIENT)
Dept: PHYSICAL THERAPY | Facility: CLINIC | Age: 64
End: 2024-01-31
Payer: MEDICARE

## 2024-01-31 DIAGNOSIS — M17.31 POST-TRAUMATIC OSTEOARTHRITIS OF RIGHT KNEE: Primary | ICD-10-CM

## 2024-01-31 DIAGNOSIS — S83.241D TEAR OF MEDIAL MENISCUS OF RIGHT KNEE, CURRENT, UNSPECIFIED TEAR TYPE, SUBSEQUENT ENCOUNTER: ICD-10-CM

## 2024-01-31 PROCEDURE — 97112 NEUROMUSCULAR REEDUCATION: CPT | Performed by: PHYSICAL THERAPIST

## 2024-01-31 PROCEDURE — 97110 THERAPEUTIC EXERCISES: CPT | Performed by: PHYSICAL THERAPIST

## 2024-01-31 NOTE — PROGRESS NOTES
Daily Note     Today's date: 2024  Patient name: Analisa Reyez  : 1960  MRN: [id number]  Referring provider: Faizan Garcia, *  Dx:   Encounter Diagnosis     ICD-10-CM    1. Post-traumatic osteoarthritis of right knee  M17.31       2. Tear of medial meniscus of right knee, current, unspecified tear type, subsequent encounter  S83.527C                        Subjective: Patient reports no adverse reaction to her LV.  She notes she is walking better - using her calf more.  She has been doing her updated HEP better.        Objective: See treatment diary below      Stage: chronic   Stability of Symptoms:  At Evaluation: stable - unchanged  Global Rating of Change:   Symptom Irritability Level: high  Primary Movement Diagnosis: poor motor control  Primary Pain Phenotype: nociplastic  Patient Specific Functional Scale:   23: return to walking 1/4 without compensations 0/10, able to negotiate curbs without pain 0/10, function without worrying about the knee 0/10; Total 0/30   FOTO Prediction: 64 by visit 12  FOTO Progress: 50 at evaluation   Greatest Concern: lack of mobility  Current Activity Plan: added to HEP ()  Current Educational Needs: progressions, confidence    Patient continue to have good tolerance to manual treatment.  She is progressing well with (R) ankle mobility.  She noted less pain and difficulty walking post treatment.  Skilled PT continues to be required to guide progression of knee and hip control and strength to allow her to have more confidence with walking and negotiating steps.         Plan: Continue with POC - monitor tolerance to treatment - progress as able NV.      Daily Treatment Diary     DX: (R) Knee OA - meniscus tear  EPOC: 24  Follow Up with Referring Provider:   Precautions: NA  CO-MORBIDITIES: HTN  HEP ACCESS CODE: [email or web address]     Treatment Diary  1/10 1/22 1/29 1/31     Manual Therapy         (R) Knee PROM Seated   8 min Supine 10' Seated  8 min  "Seated   8 min                                                           Therapeutic Exercise  HEP         Recumbent Bike  6 min 6 min 6 min 6 min     Elliptical                     SLR 12/14 15x ea 15x ea 20x ea 20x ea     S/L Hip ABD 12/14 15x ea 15x  20x ea 20x ea     Bridge 12/14 5\"x20 5\"x20  5\"x20 5\"x20               LAQ          Standing Gastroc Stretch     30\"x3     RB Taps     20x               Neuromuscular Reeducation          Foam Side Stepping                    FWD Mini Lunge   15 ea       LAT Mini Lunge   15 ea       Mini Squat  15x 20x 20x 20x               FWD Step Up  6\" Step  10x 6\" Step  10x  6\" Step  20x     LAT Step Up                    TB Counter Balance                    Therapeutic Activity                              Gait Training          Step  thru w/ toe off/knee flex   x10       Ambulation with dual task    Count to 50 by 1  30 feet                Modalities                                        "

## 2024-02-05 ENCOUNTER — OFFICE VISIT (OUTPATIENT)
Dept: PHYSICAL THERAPY | Facility: CLINIC | Age: 64
End: 2024-02-05
Payer: MEDICARE

## 2024-02-05 DIAGNOSIS — S83.241D TEAR OF MEDIAL MENISCUS OF RIGHT KNEE, CURRENT, UNSPECIFIED TEAR TYPE, SUBSEQUENT ENCOUNTER: ICD-10-CM

## 2024-02-05 DIAGNOSIS — M17.31 POST-TRAUMATIC OSTEOARTHRITIS OF RIGHT KNEE: Primary | ICD-10-CM

## 2024-02-05 PROCEDURE — 97110 THERAPEUTIC EXERCISES: CPT | Performed by: PHYSICAL THERAPIST

## 2024-02-05 PROCEDURE — 97112 NEUROMUSCULAR REEDUCATION: CPT | Performed by: PHYSICAL THERAPIST

## 2024-02-05 NOTE — PROGRESS NOTES
Daily Note     Today's date: 2024  Patient name: Analisa Reyez  : 1960  MRN: [id number]  Referring provider: Faizan Garcia, *  Dx:   Encounter Diagnosis     ICD-10-CM    1. Post-traumatic osteoarthritis of right knee  M17.31       2. Tear of medial meniscus of right knee, current, unspecified tear type, subsequent encounter  S83.057H                        Subjective: Patient reports good tolerance to her LV.  She reports she continues to perform her HEP regularly.  She continues to see differences in ankle mobility.  Walking is improving but she is still having difficulty with confidence.        Objective: See treatment diary below      Stage: chronic   Stability of Symptoms:  At Evaluation: stable - unchanged  Global Rating of Change:   Symptom Irritability Level: high  Primary Movement Diagnosis: poor motor control  Primary Pain Phenotype: nociplastic  Patient Specific Functional Scale:   23: return to walking 1/4 without compensations 0/10, able to negotiate curbs without pain 0/10, function without worrying about the knee 0/10; Total 0/30   FOTO Prediction: 64 by visit 12  FOTO Progress: 50 at evaluation   Greatest Concern: lack of mobility  Current Activity Plan: added to HEP ()  Current Educational Needs: progressions, confidence    Patient had good tolerance to manual treatment.  She continues to be challenged with exercises - displayed improvement in step up ability.  She also displayed improvements in walking post treatment.  She continues to analyze her movement on a deep level which impacts her performance.  Skilled PT continues to be required to guide progression of knee and hip control and strength to allow her to have more confidence with walking and negotiating steps.         Plan: Continue with POC - monitor tolerance to treatment - progress as able NV.      Daily Treatment Diary     DX: (R) Knee OA - meniscus tear  EPOC: 24  Follow Up with Referring  "Provider:   Precautions: NA  CO-MORBIDITIES: HTN  HEP ACCESS CODE: [email or web address]     Treatment Diary  1/29 1/31 2/5      Manual Therapy         (R) Knee PROM Seated  8 min Seated   8 min Seated   4 min      (R) Ankle Mobs   Gr 2/3  4 min                                                   Therapeutic Exercise  HEP         Recumbent Bike  6 min 6 min 6 min      Elliptical                     SLR 12/14 20x ea 20x ea 20x ea      S/L Hip ABD 12/14 20x ea 20x ea 20x ea      Bridge 12/14 5\"x20 5\"x20 5\"x20                LAQ          Standing Gastroc Stretch   30\"x3       RB Taps   20x 20x                Neuromuscular Reeducation          Foam Side Stepping                    FWD Mini Lunge          LAT Mini Lunge          Mini Squat  20x 20x 20x                 FWD Step Up   6\" Step  20x 8\" Step  15x ea      LAT Step Up                    TB Counter Balance                    Therapeutic Activity                              Gait Training          Step  thru w/ toe off/knee flex          Ambulation with dual task  Count to 50 by 1  30 feet                  Modalities                                        "

## 2024-02-07 ENCOUNTER — APPOINTMENT (OUTPATIENT)
Dept: PHYSICAL THERAPY | Facility: CLINIC | Age: 64
End: 2024-02-07
Payer: MEDICARE

## 2024-02-12 ENCOUNTER — OFFICE VISIT (OUTPATIENT)
Dept: PHYSICAL THERAPY | Facility: CLINIC | Age: 64
End: 2024-02-12
Payer: MEDICARE

## 2024-02-12 DIAGNOSIS — S83.241D TEAR OF MEDIAL MENISCUS OF RIGHT KNEE, CURRENT, UNSPECIFIED TEAR TYPE, SUBSEQUENT ENCOUNTER: ICD-10-CM

## 2024-02-12 DIAGNOSIS — M17.31 POST-TRAUMATIC OSTEOARTHRITIS OF RIGHT KNEE: Primary | ICD-10-CM

## 2024-02-12 PROCEDURE — 97110 THERAPEUTIC EXERCISES: CPT | Performed by: PHYSICAL THERAPIST

## 2024-02-12 PROCEDURE — 97140 MANUAL THERAPY 1/> REGIONS: CPT | Performed by: PHYSICAL THERAPIST

## 2024-02-12 NOTE — PROGRESS NOTES
Daily Note     Today's date: 2024  Patient name: Analisa Reyez  : 1960  MRN: [id number]  Referring provider: Faizan Garcia, *  Dx:   Encounter Diagnosis     ICD-10-CM    1. Post-traumatic osteoarthritis of right knee  M17.31       2. Tear of medial meniscus of right knee, current, unspecified tear type, subsequent encounter  S83.692D                        Subjective: Patient reports no adverse reaction to her LV.  She notes she is more sore today - attributed to weather changes.        Objective: See treatment diary below      Stage: chronic   Stability of Symptoms:  At Evaluation: stable - unchanged  Global Rating of Change:   Symptom Irritability Level: high  Primary Movement Diagnosis: poor motor control  Primary Pain Phenotype: nociplastic  Patient Specific Functional Scale:   23: return to walking 1/4 without compensations 0/10, able to negotiate curbs without pain 0/10, function without worrying about the knee 0/10; Total 0/30   FOTO Prediction: 64 by visit 12  FOTO Progress: 50 at evaluation   Greatest Concern: lack of mobility  Current Activity Plan: added to HEP ()  Current Educational Needs: progressions, confidence    Patient continues to respond well to manual treatment.  She was able to perform all exercises without complaints of pain.  She noted feeling less stiffness and soreness post treatment.  Skilled PT continues to be required to guide progression of knee and hip control and strength to allow her to have more confidence with walking and negotiating steps.         Plan: Continue with POC - monitor tolerance to treatment - progress as able NV.      Daily Treatment Diary     DX: (R) Knee OA - meniscus tear  EPOC: 24  Follow Up with Referring Provider:   Precautions: NA  CO-MORBIDITIES: HTN  HEP ACCESS CODE: [email or web address]     Treatment Diary       Manual Therapy         (R) Knee PROM Seated  8 min Seated   8 min Seated   4 min Seated  4 min    "  (R) Ankle Mobs   Gr 2/3  4 min Gr 2/3  4 min                                                  Therapeutic Exercise  HEP         Recumbent Bike  6 min 6 min 6 min 6 min     Elliptical                     SLR 12/14 20x ea 20x ea 20x ea 20x ea     S/L Hip ABD 12/14 20x ea 20x ea 20x ea 20x ea     Bridge 12/14 5\"x20 5\"x20 5\"x20 5\"x20               LAQ          Standing Gastroc Stretch   30\"x3       RB Taps   20x 20x 20x               Neuromuscular Reeducation          Foam Side Stepping                    FWD Mini Lunge          LAT Mini Lunge          Mini Squat  20x 20x 20x  20x               FWD Step Up   6\" Step  20x 8\" Step  15x ea 8\" Step  20x ea     LAT Step Up                    TB Counter Balance                    Therapeutic Activity                              Gait Training          Step  thru w/ toe off/knee flex          Ambulation with dual task  Count to 50 by 1  30 feet                  Modalities                                        "

## 2024-02-14 ENCOUNTER — APPOINTMENT (OUTPATIENT)
Dept: PHYSICAL THERAPY | Facility: CLINIC | Age: 64
End: 2024-02-14
Payer: MEDICARE

## 2024-02-19 ENCOUNTER — APPOINTMENT (OUTPATIENT)
Dept: PHYSICAL THERAPY | Facility: CLINIC | Age: 64
End: 2024-02-19
Payer: MEDICARE

## 2024-02-22 ENCOUNTER — EVALUATION (OUTPATIENT)
Dept: PHYSICAL THERAPY | Facility: CLINIC | Age: 64
End: 2024-02-22
Payer: MEDICARE

## 2024-02-22 DIAGNOSIS — M17.31 POST-TRAUMATIC OSTEOARTHRITIS OF RIGHT KNEE: Primary | ICD-10-CM

## 2024-02-22 PROCEDURE — 97110 THERAPEUTIC EXERCISES: CPT | Performed by: PHYSICAL THERAPIST

## 2024-02-22 NOTE — PROGRESS NOTES
PT Re-Evaluation     Today's date: 2024  Patient name: Analisa Reyez  : 1960  MRN: [id number]  Referring provider: Faizan Garcia, *  Dx:   Encounter Diagnosis     ICD-10-CM    1. Post-traumatic osteoarthritis of right knee  M17.31                      Assessment  Assessment details: Patient has attended 12 PT treatment sessions from 23 to 24. Since initial evaluation patient displays with objective improvements with pain levels, strength, ROM, and function. Patient has achieved a 3 point increase on the Global Rating of Change scale. Patient demonstrates a 5 point improvement on the Patient Specific Functional Scale and a 13 point improvement on FOTO indicating good progress towards her goals. At this time it is recommended that she continue with skilled PT to guide progression of hip strength to allow her to perform all functional mobility tasks with less conscious thought.     Please contact me if you have any questions ([phone]). Thank you for the opportunity to share in the care of this patient.      Impairments: abnormal gait, abnormal or restricted ROM, activity intolerance, impaired balance, impaired physical strength, lacks appropriate home exercise program and pain with function    Symptom irritability: moderateUnderstanding of Dx/Px/POC: good   Prognosis: fair  Prognosis details: Positive prognostic indicators include positive attitude toward recovery, motivated to improve, good understanding of condition, realistic expectations.  Negative prognostic indicators include chronicity of symptoms, high symptom irritability.     Goals  STG to be met in 4 weeks:  - Increase (R) Knee AROM: 0-125 degrees. - met  - Increase (B) Hip strength by 1/2 MMT grade. - met  - Improve SL balance to 5 seconds. - met  - I with HEP. - met  - Patient will be able to walk 1/8 mile without pain. - met  LTG to be met in 8 weeks:  - Increase (R) Knee AROM: 0-130 degrees.  - Increase (R) Hip  strength to 4+/5 all planes.  - Improve SL balance to 15 seconds.   - I with updated HEP.  - Patient will be able to perform all ADLs without thinking about her knee.       Plan  Plan details: Prognosis above is given PT services 2x/week tapering to 1x/week over the next 8 weeks and home program adherence.     Patient would benefit from: skilled physical therapy  Planned modality interventions: cryotherapy and thermotherapy: hydrocollator packs  Planned therapy interventions: joint mobilization, manual therapy, neuromuscular re-education, patient education, strengthening, stretching, therapeutic activities, therapeutic exercise, home exercise program, body mechanics training, activity modification, functional ROM exercises, gait training and balance  Plan of Care beginning date: 2/8/2024  Plan of Care expiration date: 4/18/2024  Treatment plan discussed with: patient        Subjective Evaluation    History of Present Illness  Mechanism of injury: At Evaluation (December 14, 2023): Patient reports insidious onset of (R) sided lower back and (R) knee pain in May.  She was attending PT in [place] for her lower back - her strength improved but pain persisted.  She underwent lumbar and (R) knee MRIs which showed fraying of the meniscus in the (R) knee.  She recently moved to this area - established care with Dr. Garcia - recommended cortisone injection and PT.     Red Flag Screen:  Patient denies recent fever, changes in bowel and bladder function, nausea and vomiting, weakness, unexplained weight loss, tingling, numbness, pain with coughing, or traumatic LILIANA.      Greatest concern: lack of mobility     (2/22/24): Patient reports since starting PT she sees improvement with pain levels, ROM, strength, and function. Global Rating of Change: +3. Her symptoms continue to fluctuate and limit her function - at times she is able to walk and not think about her knee too much. She is consistently feeling better with performing  exercises.    Quality of life: good    Patient Goals  Patient goal: Patient Specific Functional Scale: return to walking 1/4 without compensations 0/10, able to negotiate curbs without pain 2/10, function without worrying about the knee 3/10; Total 5/30  Pain  Current pain ratin  At best pain ratin  At worst pain ratin  Location: (R) knee - anterior and lateral  Quality: pressure, dull ache and sharp  Alleviating factors: Activity modification, gentle movement.  Exacerbated by: walking longer distances, weather changes, walking on uneven surfaces, step wthout handrail.    Social Support  Steps to enter house: no  Stairs in house: no   Lives in: one-story house  Lives with: spouse    Employment status: not working    Diagnostic Tests  X-ray: normal  MRI studies: abnormal  Treatments  Previous treatment: physical therapy  Current treatment: injection treatment        Objective     Static Posture   General Observations  Symmetrical weight bearing.     Palpation     Additional Palpation Details  TTP lateral (R) joint line    Active Range of Motion   Left Knee   Flexion: 130 degrees   Extension: 0 degrees     Right Knee   Flexion: 125 degrees   Extension: 0 degrees     Strength/Myotome Testing     Left Hip   Planes of Motion   Extension: 3+  Abduction: 4-    Right Hip   Planes of Motion   Extension: 3+  Abduction: 4-    Left Knee   Flexion: 5  Extension: 5    Right Knee   Flexion: 4  Extension: 4+    Tests     Left Knee   Negative anterior drawer, posterior drawer, valgus stress test at 0 degrees, valgus stress test at 30 degrees, varus stress test at 0 degrees and varus stress test at 30 degrees.     Right Knee   Negative anterior drawer, lateral Evin, medial Evin, posterior drawer, valgus stress test at 0 degrees, valgus stress test at 30 degrees, varus stress test at 0 degrees and varus stress test at 30 degrees.     Ambulation     Observational Gait   Gait: antalgic   Decreased walking speed,  "right stance time and right step length.   Right foot contact pattern: foot flat    Functional Assessment        Comments  Squat: poor hip control and depth             Daily Treatment Diary     DX: (R) Knee OA - meniscus tear  EPOC: 4/18/24  Follow Up with Referring Provider:   Precautions: NA  CO-MORBIDITIES: HTN  HEP ACCESS CODE: [email or web address]     Stage: chronic   Stability of Symptoms:  At Evaluation: stable - unchanged  Global Rating of Change:   Symptom Irritability Level: high  Primary Movement Diagnosis: poor motor control  Primary Pain Phenotype: nociplastic  Patient Specific Functional Scale:   12/14/23: return to walking 1/4 without compensations 0/10, able to negotiate curbs without pain 0/10, function without worrying about the knee 0/10; Total 0/30   FOTO Prediction: 64 by visit 12  FOTO Progress: 50 at evaluation   Greatest Concern: lack of mobility  Current Activity Plan: added to HEP (12/14)  Current Educational Needs: progressions, confidence      Treatment Diary  1/29 1/31 2/5 2/12 2/22    Manual Therapy         (R) Knee PROM Seated  8 min Seated   8 min Seated   4 min Seated  4 min     (R) Ankle Mobs   Gr 2/3  4 min Gr 2/3  4 min                                                  Therapeutic Exercise  HEP         Recumbent Bike  6 min 6 min 6 min 6 min 6 min    Elliptical                     SLR 12/14 20x ea 20x ea 20x ea 20x ea 20x ea    S/L Hip ABD 12/14 20x ea 20x ea 20x ea 20x ea 20x ea    Bridge 12/14 5\"x20 5\"x20 5\"x20 5\"x20 5\"x20              LAQ          Standing Gastroc Stretch   30\"x3       RB Taps   20x 20x 20x 20x              Neuromuscular Reeducation          Foam Side Stepping                    FWD Mini Lunge          LAT Mini Lunge          Mini Squat  20x 20x 20x  20x 20x              FWD Step Up   6\" Step  20x 8\" Step  15x ea 8\" Step  20x ea     LAT Step Up                    TB Counter Balance                    Therapeutic Activity                              Gait Training        "   Step  thru w/ toe off/knee flex          Ambulation with dual task  Count to 50 by 1  30 feet                  Modalities

## 2024-02-28 ENCOUNTER — OFFICE VISIT (OUTPATIENT)
Dept: PHYSICAL THERAPY | Facility: CLINIC | Age: 64
End: 2024-02-28
Payer: MEDICARE

## 2024-02-28 DIAGNOSIS — S83.241D TEAR OF MEDIAL MENISCUS OF RIGHT KNEE, CURRENT, UNSPECIFIED TEAR TYPE, SUBSEQUENT ENCOUNTER: ICD-10-CM

## 2024-02-28 DIAGNOSIS — M17.31 POST-TRAUMATIC OSTEOARTHRITIS OF RIGHT KNEE: Primary | ICD-10-CM

## 2024-02-28 PROCEDURE — 97110 THERAPEUTIC EXERCISES: CPT | Performed by: PHYSICAL THERAPIST

## 2024-02-28 PROCEDURE — 97112 NEUROMUSCULAR REEDUCATION: CPT | Performed by: PHYSICAL THERAPIST

## 2024-02-28 NOTE — PROGRESS NOTES
Daily Note     Today's date: 2024  Patient name: Analisa Reyez  : 1960  MRN: [id number]  Referring provider: Faizan Garcia, *  Dx:   Encounter Diagnosis     ICD-10-CM    1. Post-traumatic osteoarthritis of right knee  M17.31       2. Tear of medial meniscus of right knee, current, unspecified tear type, subsequent encounter  S83.542A                      Subjective: Patient reports today is a little more achy than normal - attributed to weather changes.        Objective: See treatment diary below      Assessment:   Stage: chronic   Stability of Symptoms:  At Evaluation: stable - unchanged  Global Rating of Change:   Symptom Irritability Level: high  Primary Movement Diagnosis: poor motor control  Primary Pain Phenotype: nociplastic  Patient Specific Functional Scale:   23: return to walking 1/4 without compensations 0/10, able to negotiate curbs without pain 0/10, function without worrying about the knee 0/10; Total 0/30   FOTO Prediction: 64 by visit 12  FOTO Progress: 50 at evaluation   Greatest Concern: lack of mobility  Current Activity Plan: added to HEP ()  Current Educational Needs: progressions, confidence    Patient had good tolerance to manual treatment - less pain following treatment.  She had good exercise form and recall with exercises.  She noted fatigue with exercises and no increased pain.  Skilled PT continues to be required to guide progression of hip strength to allow her to perform all functional mobility tasks with less conscious thought.       Plan: Continue with POC - monitor tolerance to treatment - progress as able NV       Daily Treatment Diary     DX: (R) Knee OA - meniscus tear  EPOC: 24  Follow Up with Referring Provider:   Precautions: NA  CO-MORBIDITIES: HTN  HEP ACCESS CODE: [email or web address]           Treatment Diary       Manual Therapy         (R) Knee PROM Seated   4 min Seated  4 min  Seated   8 min     (R) Ankle Mobs Gr 2/3  4  "min Gr 2/3  4 min                                                    Therapeutic Exercise  HEP         Recumbent Bike  6 min 6 min 6 min 6 min     Elliptical                     SLR 12/14 20x ea 20x ea 20x ea 20x ea     S/L Hip ABD 12/14 20x ea 20x ea 20x ea 20x ea     Bridge 12/14 5\"x20 5\"x20 5\"x20 5\"x20               LAQ          Standing Gastroc Stretch          RB Taps  20x 20x 20x 20x               Neuromuscular Reeducation          Foam Side Stepping                    FWD Mini Lunge          LAT Mini Lunge          Mini Squat  20x  20x 20x 20x ea               FWD Step Up  8\" Step  15x ea 8\" Step  20x ea  8\" Step  20x ea     LAT Step Up                    TB Counter Balance                    Therapeutic Activity                              Gait Training          Step  thru w/ toe off/knee flex          Ambulation with dual task                    Modalities                                    "

## 2024-03-01 ENCOUNTER — APPOINTMENT (OUTPATIENT)
Dept: PHYSICAL THERAPY | Facility: CLINIC | Age: 64
End: 2024-03-01
Payer: MEDICARE

## 2024-03-04 ENCOUNTER — OFFICE VISIT (OUTPATIENT)
Dept: PHYSICAL THERAPY | Facility: CLINIC | Age: 64
End: 2024-03-04
Payer: MEDICARE

## 2024-03-04 DIAGNOSIS — M17.31 POST-TRAUMATIC OSTEOARTHRITIS OF RIGHT KNEE: Primary | ICD-10-CM

## 2024-03-04 DIAGNOSIS — S83.241D TEAR OF MEDIAL MENISCUS OF RIGHT KNEE, CURRENT, UNSPECIFIED TEAR TYPE, SUBSEQUENT ENCOUNTER: ICD-10-CM

## 2024-03-04 PROCEDURE — 97112 NEUROMUSCULAR REEDUCATION: CPT | Performed by: PHYSICAL THERAPIST

## 2024-03-04 PROCEDURE — 97110 THERAPEUTIC EXERCISES: CPT | Performed by: PHYSICAL THERAPIST

## 2024-03-04 NOTE — PROGRESS NOTES
Daily Note     Today's date: 3/4/2024  Patient name: Analisa Reyez  : 1960  MRN: [id number]  Referring provider: Faizan Garcia, *  Dx:   Encounter Diagnosis     ICD-10-CM    1. Post-traumatic osteoarthritis of right knee  M17.31       2. Tear of medial meniscus of right knee, current, unspecified tear type, subsequent encounter  S83.873Y                      Subjective: Patient reports she is feeling a little better overall - noted her knee is less painful.   She also notes her ankle is moving better.  She is exercising regularly.        Objective: See treatment diary below      Assessment:   Stage: chronic   Stability of Symptoms:  At Evaluation: stable - unchanged  Global Rating of Change:   Symptom Irritability Level: high  Primary Movement Diagnosis: poor motor control  Primary Pain Phenotype: nociplastic  Patient Specific Functional Scale:   23: return to walking 1/4 without compensations 0/10, able to negotiate curbs without pain 0/10, function without worrying about the knee 0/10; Total 0/30   FOTO Prediction: 64 by visit 12  FOTO Progress: 50 at evaluation   Greatest Concern: lack of mobility  Current Activity Plan: added to HEP ()  Current Educational Needs: progressions, confidence    Patient had good tolerance to manual treatment.  She continues to progress with exercise tolerance.  She noted no pain with lateral step ups today.  She did reports having some fatigue but no pain post treatment.  She continues to struggle with moving without excessive conscious thought.  Skilled PT continues to be required to guide progression of hip strength to allow her to perform all functional mobility tasks with less conscious thought.       Plan: Continue with POC - monitor tolerance to treatment - progress as able NV       Daily Treatment Diary     DX: (R) Knee OA - meniscus tear  EPOC: 24  Follow Up with Referring Provider:   Precautions: NA  CO-MORBIDITIES: HTN  HEP ACCESS CODE:  "[id number]           Treatment Diary  2/5 2/12 2/22 2/28 3/4    Manual Therapy         (R) Knee PROM Seated   4 min Seated  4 min  Seated   8 min Seated   8 min    (R) Ankle Mobs Gr 2/3  4 min Gr 2/3  4 min                                                    Therapeutic Exercise  HEP         Recumbent Bike  6 min 6 min 6 min 6 min 6 min    Elliptical                     SLR 12/14 20x ea 20x ea 20x ea 20x ea 20x ea    S/L Hip ABD 12/14 20x ea 20x ea 20x ea 20x ea 20x ea    Bridge 12/14 5\"x20 5\"x20 5\"x20 5\"x20 5\"x20              LAQ          Standing Gastroc Stretch          RB Taps  20x 20x 20x 20x 20x              Neuromuscular Reeducation          Foam Side Stepping                    FWD Mini Lunge          LAT Mini Lunge          Mini Squat  20x  20x 20x 20x ea 20x              FWD Step Up  8\" Step  15x ea 8\" Step  20x ea  8\" Step  20x ea 8\" Step  20x ea    LAT Step Up      8\" Step  20x ea              TB Counter Balance                    Therapeutic Activity                              Gait Training          Step  thru w/ toe off/knee flex          Ambulation with dual task                    Modalities                                    "

## 2024-03-06 ENCOUNTER — OFFICE VISIT (OUTPATIENT)
Dept: PODIATRY | Facility: CLINIC | Age: 64
End: 2024-03-06
Payer: MEDICARE

## 2024-03-06 ENCOUNTER — OFFICE VISIT (OUTPATIENT)
Dept: PHYSICAL THERAPY | Facility: CLINIC | Age: 64
End: 2024-03-06
Payer: MEDICARE

## 2024-03-06 VITALS
DIASTOLIC BLOOD PRESSURE: 89 MMHG | WEIGHT: 155 LBS | HEIGHT: 64 IN | SYSTOLIC BLOOD PRESSURE: 145 MMHG | BODY MASS INDEX: 26.46 KG/M2

## 2024-03-06 DIAGNOSIS — R26.9 ABNORMAL GAIT: ICD-10-CM

## 2024-03-06 DIAGNOSIS — M17.31 POST-TRAUMATIC OSTEOARTHRITIS OF RIGHT KNEE: Primary | ICD-10-CM

## 2024-03-06 DIAGNOSIS — M24.571 EQUINUS CONTRACTURE OF RIGHT ANKLE: Primary | ICD-10-CM

## 2024-03-06 DIAGNOSIS — S83.241D TEAR OF MEDIAL MENISCUS OF RIGHT KNEE, CURRENT, UNSPECIFIED TEAR TYPE, SUBSEQUENT ENCOUNTER: ICD-10-CM

## 2024-03-06 DIAGNOSIS — G57.31 PERONEAL NEUROPATHY, RIGHT: ICD-10-CM

## 2024-03-06 PROCEDURE — 97110 THERAPEUTIC EXERCISES: CPT | Performed by: PHYSICAL THERAPIST

## 2024-03-06 PROCEDURE — 99204 OFFICE O/P NEW MOD 45 MIN: CPT | Performed by: PODIATRIST

## 2024-03-06 PROCEDURE — 97112 NEUROMUSCULAR REEDUCATION: CPT

## 2024-03-06 NOTE — PROGRESS NOTES
PATIENT:  Analisa Reyez  1960       ASSESSMENT:     1. Equinus contracture of right ankle        2. Abnormal gait        3. Peroneal neuropathy, right                  PLAN:  1. Reviewed medical records.  Reviewed medical records.  Reviewed the notes from PT / orthopedics .  Reviewed scanogram.  Patient was counseled and educated on the condition and the diagnosis.    2. The diagnosis, treatment options and prognosis were discussed with the patient.    3. She has dysfunction of right foot which could be from right knee issue vs peroneal neuropathy vs radiculopathy.  Patient remembers to have some nerve test in [place] and instructed her to obtain medical record.  I would consider EMG / NCV depending on the progress.  4. She has fairly severe ankle equinus.  Will continue PT and check into [facility].    5. Instructed home exercise.  Sent her for OTC orthotics.    6. Patient will return in 6 weeks for re-evaluation.       Imaging: I have personally reviewed pertinent films in PACS  Labs, pathology, and Other Studies: I have personally reviewed pertinent reports.      I have spent a total time of 45 minutes on 03/07/24 in caring for this patient including Diagnostic results, Prognosis, Risks and benefits of tx options, Instructions for management, Patient and family education, Importance of tx compliance, Impressions, Counseling / Coordination of care, Reviewing / ordering tests, medicine, procedures  , and Obtaining or reviewing history  .       Subjective:       HPI  The patient presents with chief complaint of right foot problem.  She has some dysfunction / instability of right foot.  She feels she is not walking right.  It has been worse since she has knee problem in the past year.  She was orthopedist and PT.  Her right knee is little better, but foot does not seem to improve.  She denied any injury to her right foot / ankle.  No significant numbness in her right foot.  She reported she had a  test that she believes it was for nerve disorder in [place].  She reports the test was normal.        The following portions of the patient's history were reviewed and updated as appropriate: allergies, current medications, past family history, past medical history, past social history, past surgical history and problem list.  All pertinent labs and images were reviewed.      Past Medical History  Past Medical History:   Diagnosis Date    Arthritis 2020    Hypertension        Past Surgical History  Past Surgical History:   Procedure Laterality Date    BREAST SURGERY  2018    Right breast        Allergies:  Sulfa antibiotics    Medications:  Current Outpatient Medications   Medication Sig Dispense Refill    baclofen 10 mg tablet Take 1 tablet (10 mg total) by mouth daily at bedtime as needed for muscle spasms 30 tablet 0    escitalopram (LEXAPRO) 10 mg tablet Take 0.5 tablets (5 mg total) by mouth daily 15 tablet     Multiple Vitamin (multivitamin) tablet Take 1 tablet by mouth daily      olmesartan (BENICAR) 20 mg tablet Take 1 tablet (20 mg total) by mouth daily 30 tablet      No current facility-administered medications for this visit.       Social History:  Social History     Socioeconomic History    Marital status: /Civil Union     Spouse name: None    Number of children: None    Years of education: None    Highest education level: None   Occupational History    None   Tobacco Use    Smoking status: Never    Smokeless tobacco: Never   Vaping Use    Vaping status: Never Used   Substance and Sexual Activity    Alcohol use: Not Currently    Drug use: Never    Sexual activity: Yes     Partners: Male   Other Topics Concern    None   Social History Narrative    None     Social Determinants of Health     Financial Resource Strain: Not on file   Food Insecurity: Not on file   Transportation Needs: Not on file   Physical Activity: Not on file   Stress: Not on file   Social Connections: Not on file   Intimate Partner  "Violence: Not on file   Housing Stability: Not on file          Review of Systems   Constitutional:  Negative for chills and fever.   Respiratory:  Negative for cough and shortness of breath.    Cardiovascular:  Negative for chest pain.   Gastrointestinal:  Negative for nausea and vomiting.   Musculoskeletal:  Positive for arthralgias and gait problem.   Skin:  Negative for wound.   Allergic/Immunologic: Negative for immunocompromised state.   Neurological:  Negative for dizziness and tremors.   Hematological: Negative.    Psychiatric/Behavioral:  Negative for behavioral problems and confusion.          Objective:      /89   Ht 5' 4\" (1.626 m)   Wt 70.3 kg (155 lb)   BMI 26.61 kg/m²          Physical Exam  Vitals reviewed.   Constitutional:       General: She is not in acute distress.     Appearance: She is not toxic-appearing or diaphoretic.   HENT:      Head: Normocephalic and atraumatic.   Eyes:      Extraocular Movements: Extraocular movements intact.   Cardiovascular:      Rate and Rhythm: Normal rate and regular rhythm.      Pulses: Normal pulses.           Dorsalis pedis pulses are 2+ on the right side and 2+ on the left side.        Posterior tibial pulses are 2+ on the right side and 2+ on the left side.   Pulmonary:      Effort: Pulmonary effort is normal. No respiratory distress.   Musculoskeletal:         General: No signs of injury.      Cervical back: Normal range of motion and neck supple.      Right lower leg: No edema.      Left lower leg: No edema.      Right foot: No foot drop.      Left foot: No foot drop.      Comments: Apropulsive gait.  Decreased active right ankle dorsiflexion.  Tight achilles / calf of right ankle with severe equinus.  No focal pain or acute swelling.  Abduction of right foot on stance.     Skin:     General: Skin is warm.      Capillary Refill: Capillary refill takes less than 2 seconds.      Coloration: Skin is not cyanotic or mottled.      Findings: No abscess. "      Nails: There is no clubbing.   Neurological:      General: No focal deficit present.      Mental Status: She is alert and oriented to person, place, and time.      Cranial Nerves: No cranial nerve deficit.      Sensory: No sensory deficit.      Motor: No weakness.      Coordination: Coordination normal.      Gait: Gait abnormal.      Comments: No Tinel sign.     Psychiatric:         Mood and Affect: Mood normal.         Behavior: Behavior normal.         Thought Content: Thought content normal.         Judgment: Judgment normal.

## 2024-03-06 NOTE — PROGRESS NOTES
Daily Note     Today's date: 3/6/2024  Patient name: Analisa Reyez  : 1960  MRN: [id number]  Referring provider: Faizan Garcia, *  Dx:   Encounter Diagnosis     ICD-10-CM    1. Post-traumatic osteoarthritis of right knee  M17.31       2. Tear of medial meniscus of right knee, current, unspecified tear type, subsequent encounter  S83.241D           Start Time: 920  Stop Time: 1010  Total time in clinic (min): 50 minutes    Subjective: Patient reports she visited the Podiatrist for her ankle; however it was concluded that her symptoms may be due to a tight calf. She is feeling a little better overall .        Objective: See treatment diary below      Assessment:  Focused on manuals by PT,  LE strengthening and functional activities.  Patient is approprietly challenged with current program, tried ankle weight to supine activities able to perform during SLR with good response; however had difficulty during S/L Abd- hard to keep leg straight, used compensatory strategies due to hip weakness- faired better without weight today.  Patient also challenged during stair activities to keep proper form- had tendency to keep leg straight and had difficulty with bending the knee and flexing hips to clear stairs without using compensatory strategies. Patient benefits from cues to try to increase tempo and rhythm during exercise to become more automatic, with less conscious thought to increase functional mobility. Patient would benefit from skilled PT to address deficits.      Stage: chronic   Stability of Symptoms:  At Evaluation: stable - unchanged  Global Rating of Change:   Symptom Irritability Level: high  Primary Movement Diagnosis: poor motor control  Primary Pain Phenotype: nociplastic  Patient Specific Functional Scale:   23: return to walking 1/4 without compensations 0/10, able to negotiate curbs without pain 0/10, function without worrying about the knee 0/10; Total 0/30   FOTO Prediction: 64  "by visit 12  FOTO Progress: 50 at evaluation   Greatest Concern: lack of mobility  Current Activity Plan: added to HEP (12/14)  Current Educational Needs: progressions, confidence          Plan: Continue with POC - monitor tolerance to treatment - progress as able NV       Daily Treatment Diary     DX: (R) Knee OA - meniscus tear  EPOC: 4/18/24  Follow Up with Referring Provider:   Precautions: NA  CO-MORBIDITIES: HTN  HEP ACCESS CODE: [email or web address]           Treatment Diary  2/5 2/12 2/22 2/28 3/4 3/6   Manual Therapy         (R) Knee PROM Seated   4 min Seated  4 min  Seated   8 min Seated   8 min ME  Seated 8 min   (R) Ankle Mobs Gr 2/3  4 min Gr 2/3  4 min    ME AP  Grade 2/3  4'                                                Therapeutic Exercise  HEP         Recumbent Bike  6 min 6 min 6 min 6 min 6 min 6 min   Elliptical                     SLR 12/14 20x ea 20x ea 20x ea 20x ea 20x ea 1# 20x ea   S/L Hip ABD 12/14 20x ea 20x ea 20x ea 20x ea 20x ea 20x ea   Bridge 12/14 5\"x20 5\"x20 5\"x20 5\"x20 5\"x20 5\"x20             LAQ          Standing Gastroc Stretch          RB Taps  20x 20x 20x 20x 20x 20x             Neuromuscular Reeducation          Foam Side Stepping                    FWD Mini Lunge          LAT Mini Lunge          Mini Squat  20x  20x 20x 20x ea 20x 20x             FWD Step Up  8\" Step  15x ea 8\" Step  20x ea  8\" Step  20x ea 8\" Step  20x ea 8\" Step  20x ea   LAT Step Up      8\" Step  20x ea 8\" Step  20x ea             TB Counter Balance                    Therapeutic Activity                              Gait Training          Step  thru w/ toe off/knee flex          Ambulation with dual task                    Modalities                                    "

## 2024-03-08 ENCOUNTER — RA CDI HCC (OUTPATIENT)
Dept: OTHER | Facility: HOSPITAL | Age: 64
End: 2024-03-08

## 2024-03-11 ENCOUNTER — OFFICE VISIT (OUTPATIENT)
Dept: PHYSICAL THERAPY | Facility: CLINIC | Age: 64
End: 2024-03-11
Payer: MEDICARE

## 2024-03-11 DIAGNOSIS — S83.241D TEAR OF MEDIAL MENISCUS OF RIGHT KNEE, CURRENT, UNSPECIFIED TEAR TYPE, SUBSEQUENT ENCOUNTER: ICD-10-CM

## 2024-03-11 DIAGNOSIS — M17.31 POST-TRAUMATIC OSTEOARTHRITIS OF RIGHT KNEE: Primary | ICD-10-CM

## 2024-03-11 PROCEDURE — 97112 NEUROMUSCULAR REEDUCATION: CPT | Performed by: PHYSICAL THERAPIST

## 2024-03-11 PROCEDURE — 97110 THERAPEUTIC EXERCISES: CPT | Performed by: PHYSICAL THERAPIST

## 2024-03-11 NOTE — PROGRESS NOTES
Daily Note     Today's date: 3/11/2024  Patient name: Analisa Reyez  : 1960  MRN: [id number]  Referring provider: Faizan Garcia, *  Dx:   Encounter Diagnosis     ICD-10-CM    1. Post-traumatic osteoarthritis of right knee  M17.31       2. Tear of medial meniscus of right knee, current, unspecified tear type, subsequent encounter  S83.359W                      Subjective: Patient reports overall her knee is feeling a little better - less pain.  She notes her walking is improving slowly.  She had no adverse reaction to her LV.        Objective: See treatment diary below      Assessment:   Stage: chronic   Stability of Symptoms:  At Evaluation: stable - unchanged  Global Rating of Change:   Symptom Irritability Level: high  Primary Movement Diagnosis: poor motor control  Primary Pain Phenotype: nociplastic  Patient Specific Functional Scale:   23: return to walking 1/4 without compensations 0/10, able to negotiate curbs without pain 0/10, function without worrying about the knee 0/10; Total 0/30   FOTO Prediction: 64 by visit 12  FOTO Progress: 50 at evaluation   Greatest Concern: lack of mobility  Current Activity Plan: added to HEP ()  Current Educational Needs: progressions, confidence    Patient continues to tolerate manual treatment well.  She continues to progress slowly with exercises - she is requiring less cuing for form.  She continues to be challenged with current level of exercise. Skilled PT continues to be required to guide progression of hip strength to allow her to perform all functional mobility tasks with less conscious thought.       Plan: Continue with POC - monitor tolerance to treatment - progress as able NV       Daily Treatment Diary     DX: (R) Knee OA - meniscus tear  EPOC: 24  Follow Up with Referring Provider:   Precautions: NA  CO-MORBIDITIES: HTN  HEP ACCESS CODE: [email or web address]           Treatment Diary  2/28 3/4 3/6 3/11     Manual Therapy         (R) Knee  "PROM Seated   8 min Seated   8 min ME  Seated 8 min Seated  8 min     (R) Ankle Mobs   ME AP  Grade 2/3  4'                                                   Therapeutic Exercise  HEP         Recumbent Bike  6 min 6 min 6 min 6 min     Elliptical                     SLR 12/14 20x ea 20x ea 1# 20x ea 1# 20x ea     S/L Hip ABD 12/14 20x ea 20x ea 20x ea 1# 20x ea     Bridge 12/14 5\"x20 5\"x20 5\"x20 5\"x20               LAQ          Standing Gastroc Stretch          RB Taps  20x 20x 20x 20x               Neuromuscular Reeducation          Foam Side Stepping                    FWD Mini Lunge          LAT Mini Lunge          Mini Squat  20x ea 20x 20x 20x               FWD Step Up  8\" Step  20x ea 8\" Step  20x ea 8\" Step  20x ea 8\" Step  20x ea     LAT Step Up   8\" Step  20x ea 8\" Step  20x ea 8\" Step  20x ea               TB Counter Balance                    Therapeutic Activity                              Gait Training          Step  thru w/ toe off/knee flex          Ambulation with dual task                    Modalities                                    "

## 2024-03-13 ENCOUNTER — APPOINTMENT (OUTPATIENT)
Dept: PHYSICAL THERAPY | Facility: CLINIC | Age: 64
End: 2024-03-13
Payer: MEDICARE

## 2024-03-18 ENCOUNTER — APPOINTMENT (OUTPATIENT)
Dept: PHYSICAL THERAPY | Facility: CLINIC | Age: 64
End: 2024-03-18
Payer: MEDICARE

## 2024-03-21 ENCOUNTER — OFFICE VISIT (OUTPATIENT)
Dept: PHYSICAL THERAPY | Facility: CLINIC | Age: 64
End: 2024-03-21
Payer: MEDICARE

## 2024-03-21 DIAGNOSIS — M17.31 POST-TRAUMATIC OSTEOARTHRITIS OF RIGHT KNEE: Primary | ICD-10-CM

## 2024-03-21 DIAGNOSIS — S83.241D TEAR OF MEDIAL MENISCUS OF RIGHT KNEE, CURRENT, UNSPECIFIED TEAR TYPE, SUBSEQUENT ENCOUNTER: ICD-10-CM

## 2024-03-21 PROCEDURE — 97110 THERAPEUTIC EXERCISES: CPT | Performed by: PHYSICAL THERAPIST

## 2024-03-21 PROCEDURE — 97112 NEUROMUSCULAR REEDUCATION: CPT | Performed by: PHYSICAL THERAPIST

## 2024-03-21 NOTE — PROGRESS NOTES
Daily Note     Today's date: 3/21/2024  Patient name: Analisa Reyez  : 1960  MRN: [id number]  Referring provider: Faizan Garcia, *  Dx:   Encounter Diagnosis     ICD-10-CM    1. Post-traumatic osteoarthritis of right knee  M17.31       2. Tear of medial meniscus of right knee, current, unspecified tear type, subsequent encounter  S83.067S                      Subjective: Patient reports she is having an off day today but overall her knee is feeling a little better - walking continues to be challenging.        Objective: See treatment diary below      Assessment:   Stage: chronic   Stability of Symptoms:  At Evaluation: stable - unchanged  Global Rating of Change:   Symptom Irritability Level: high  Primary Movement Diagnosis: poor motor control  Primary Pain Phenotype: nociplastic  Patient Specific Functional Scale:   23: return to walking 1/4 without compensations 0/10, able to negotiate curbs without pain 0/10, function without worrying about the knee 0/10; Total 0/30   FOTO Prediction: 64 by visit 12  FOTO Progress: 50 at evaluation   Greatest Concern: lack of mobility  Current Activity Plan: added to HEP ()  Current Educational Needs: progressions, confidence    Patient continues to respond well to manual treatment - ROM is improving.  She was challenged with exercise progressions but noted no increased pain post treatment.  Skilled PT continues to be required to guide progression of hip strength to allow her to perform all functional mobility tasks with less conscious thought.       Plan: Continue with POC - monitor tolerance to treatment - progress as able NV       Daily Treatment Diary     DX: (R) Knee OA - meniscus tear  EPOC: 24  Follow Up with Referring Provider:   Precautions: NA  CO-MORBIDITIES: HTN  HEP ACCESS CODE: [email or web address]           Treatment Diary  3/6 3/11 3/21      Manual Therapy         (R) Knee PROM ME  Seated 8 min Seated  8 min Seated  8 min      (R) Ankle  "Mobs ME AP  Grade 2/3  4'                                                     Therapeutic Exercise  HEP         Recumbent Bike  6 min 6 min 6 min      Elliptical                     SLR 12/14 1# 20x ea 1# 20x ea 1.5# 20x ea      S/L Hip ABD 12/14 20x ea 1# 20x ea 1.5# 20x ea      Bridge 12/14 5\"x20 5\"x20 5\"x20                LAQ          Standing Gastroc Stretch          RB Taps  20x 20x 20x                Neuromuscular Reeducation          Foam Side Stepping                    FWD Mini Lunge    10x ea      LAT Mini Lunge          Mini Squat  20x 20x 20x                FWD Step Up  8\" Step  20x ea 8\" Step  20x ea 8\" Step Up and Over  20x ea      LAT Step Up  8\" Step  20x ea 8\" Step  20x ea 8\" Step   Up and Over  20x ea                TB Counter Balance                    Therapeutic Activity                              Gait Training          Step  thru w/ toe off/knee flex          Ambulation with dual task                    Modalities                                    "

## 2024-03-25 ENCOUNTER — OFFICE VISIT (OUTPATIENT)
Dept: PHYSICAL THERAPY | Facility: CLINIC | Age: 64
End: 2024-03-25
Payer: MEDICARE

## 2024-03-25 DIAGNOSIS — M17.31 POST-TRAUMATIC OSTEOARTHRITIS OF RIGHT KNEE: Primary | ICD-10-CM

## 2024-03-25 DIAGNOSIS — S83.241D TEAR OF MEDIAL MENISCUS OF RIGHT KNEE, CURRENT, UNSPECIFIED TEAR TYPE, SUBSEQUENT ENCOUNTER: ICD-10-CM

## 2024-03-25 PROCEDURE — 97112 NEUROMUSCULAR REEDUCATION: CPT | Performed by: PHYSICAL THERAPIST

## 2024-03-25 PROCEDURE — 97110 THERAPEUTIC EXERCISES: CPT | Performed by: PHYSICAL THERAPIST

## 2024-03-27 ENCOUNTER — APPOINTMENT (OUTPATIENT)
Dept: PHYSICAL THERAPY | Facility: CLINIC | Age: 64
End: 2024-03-27
Payer: MEDICARE

## 2024-04-01 ENCOUNTER — OFFICE VISIT (OUTPATIENT)
Dept: PHYSICAL THERAPY | Facility: CLINIC | Age: 64
End: 2024-04-01
Payer: MEDICARE

## 2024-04-01 DIAGNOSIS — M17.31 POST-TRAUMATIC OSTEOARTHRITIS OF RIGHT KNEE: Primary | ICD-10-CM

## 2024-04-01 DIAGNOSIS — S83.241D TEAR OF MEDIAL MENISCUS OF RIGHT KNEE, CURRENT, UNSPECIFIED TEAR TYPE, SUBSEQUENT ENCOUNTER: ICD-10-CM

## 2024-04-01 PROCEDURE — 97110 THERAPEUTIC EXERCISES: CPT | Performed by: PHYSICAL THERAPIST

## 2024-04-01 PROCEDURE — 97112 NEUROMUSCULAR REEDUCATION: CPT | Performed by: PHYSICAL THERAPIST

## 2024-04-01 NOTE — PROGRESS NOTES
Daily Note     Today's date: 2024  Patient name: Analisa Reyez  : 1960  MRN: [id number]  Referring provider: Faizan Garcia, *  Dx:   Encounter Diagnosis     ICD-10-CM    1. Post-traumatic osteoarthritis of right knee  M17.31       2. Tear of medial meniscus of right knee, current, unspecified tear type, subsequent encounter  S83.151D                      Subjective: Patient reports no adverse reaction to her LV.  She notes she continues to progress with functional mobility.        Objective: See treatment diary below      Assessment:   Stage: chronic   Stability of Symptoms:  At Evaluation: stable - unchanged  Global Rating of Change:   Symptom Irritability Level: high  Primary Movement Diagnosis: poor motor control  Primary Pain Phenotype: nociplastic  Patient Specific Functional Scale:   23: return to walking 1/4 without compensations 0/10, able to negotiate curbs without pain 0/10, function without worrying about the knee 0/10; Total 0/30   FOTO Prediction: 64 by visit 12  FOTO Progress: 50 at evaluation   Greatest Concern: lack of mobility  Current Activity Plan: added to HEP ()  Current Educational Needs: progressions, confidence    Patient continues to respond well to manual treatment.  She continues to lack confidence with step exercises.  She is making slow progress with PT.  She had no complaints of increased pain throughout treatment - only fatigue.  Skilled PT continues to be required to guide progression of hip strength to allow her to perform all functional mobility tasks with less conscious thought.       Plan: Continue with POC - monitor tolerance to treatment - progress as able NV       Daily Treatment Diary     DX: (R) Knee OA - meniscus tear  EPOC: 24  Follow Up with Referring Provider:   Precautions: NA  CO-MORBIDITIES: HTN  HEP ACCESS CODE: [id number]           Treatment Diary  3/25 4/1       Manual Therapy         (R) Knee PROM Seated  8 min Seated  8 min   "     (R) Ankle Mobs                                                      Therapeutic Exercise  HEP         Recumbent Bike  6 min 6 min       Elliptical                     SLR 12/14 2# 20x ea 2# 20x ea       S/L Hip ABD 12/14 2# 20x ea 2# 20x ea       Bridge 12/14 5\"x20 5\"x20                 LAQ          Standing Gastroc Stretch          RB Taps                    Neuromuscular Reeducation          Foam Side Stepping                    FWD Mini Lunge  20x ea 20x ea       LAT Mini Lunge          Mini Squat  20x 20x                 FWD Step Up  8\" Step Up and Over  20x ea 8\" Step  Up and Over  20x ea       LAT Step Up  8\" Step  Up and Over  20x ea 8\" Step Up and Over  20x ea                 TB Counter Balance                    Therapeutic Activity                              Gait Training          Step  thru w/ toe off/knee flex          Ambulation with dual task                    Modalities                                    "

## 2024-04-03 ENCOUNTER — APPOINTMENT (OUTPATIENT)
Dept: PHYSICAL THERAPY | Facility: CLINIC | Age: 64
End: 2024-04-03
Payer: MEDICARE

## 2024-04-08 ENCOUNTER — OFFICE VISIT (OUTPATIENT)
Dept: PHYSICAL THERAPY | Facility: CLINIC | Age: 64
End: 2024-04-08
Payer: MEDICARE

## 2024-04-08 DIAGNOSIS — M17.31 POST-TRAUMATIC OSTEOARTHRITIS OF RIGHT KNEE: Primary | ICD-10-CM

## 2024-04-08 DIAGNOSIS — S83.241D TEAR OF MEDIAL MENISCUS OF RIGHT KNEE, CURRENT, UNSPECIFIED TEAR TYPE, SUBSEQUENT ENCOUNTER: ICD-10-CM

## 2024-04-08 PROCEDURE — 97112 NEUROMUSCULAR REEDUCATION: CPT | Performed by: PHYSICAL THERAPIST

## 2024-04-08 PROCEDURE — 97110 THERAPEUTIC EXERCISES: CPT | Performed by: PHYSICAL THERAPIST

## 2024-04-08 NOTE — PROGRESS NOTES
Daily Note     Today's date: 2024  Patient name: Analisa Reyez  : 1960  MRN: [id number]  Referring provider: Faizan Garcia, *  Dx:   Encounter Diagnosis     ICD-10-CM    1. Post-traumatic osteoarthritis of right knee  M17.31       2. Tear of medial meniscus of right knee, current, unspecified tear type, subsequent encounter  S83.198G                      Subjective: Patient reports she is having a good day today - minimal knee pain at the moment.  She continues to walk with abnormal gait.        Objective: See treatment diary below      Assessment:   Stage: chronic   Stability of Symptoms:  At Evaluation: stable - unchanged  Global Rating of Change:   Symptom Irritability Level: high  Primary Movement Diagnosis: poor motor control  Primary Pain Phenotype: nociplastic  Patient Specific Functional Scale:   23: return to walking 1/4 without compensations 0/10, able to negotiate curbs without pain 0/10, function without worrying about the knee 0/10; Total 0/30   FOTO Prediction: 64 by visit 12  FOTO Progress: 50 at evaluation   Greatest Concern: lack of mobility  Current Activity Plan: added to HEP ()  Current Educational Needs: progressions, confidence    Patient continues to respond well to manual treatment.  She was able to perform exercises without increased pain.  She noted fatigue with step ups.  Skilled PT continues to be required to guide progression of hip strength to allow her to perform all functional mobility tasks with less conscious thought.       Plan: Continue with POC - monitor tolerance to treatment - progress as able NV       Daily Treatment Diary     DX: (R) Knee OA - meniscus tear  EPOC: 24  Follow Up with Referring Provider:   Precautions: NA  CO-MORBIDITIES: HTN  HEP ACCESS CODE: [id number]           Treatment Diary  3/25 4/1 4/8      Manual Therapy         (R) Knee PROM Seated  8 min Seated  8 min Seated  8 min      (R) Ankle Mobs                             "                          Therapeutic Exercise  HEP         Recumbent Bike  6 min 6 min 6 min      Elliptical                     SLR 12/14 2# 20x ea 2# 20x ea       S/L Hip ABD 12/14 2# 20x ea 2# 20x ea       Bridge 12/14 5\"x20 5\"x20                 LAQ          Standing Gastroc Stretch          RB Taps                    Neuromuscular Reeducation          Foam Side Stepping                    FWD Mini Lunge  20x ea 20x ea       LAT Mini Lunge          Mini Squat  20x 20x                 FWD Step Up  8\" Step Up and Over  20x ea 8\" Step  Up and Over  20x ea       LAT Step Up  8\" Step  Up and Over  20x ea 8\" Step Up and Over  20x ea                 TB Counter Balance                    Therapeutic Activity                              Gait Training          Step  thru w/ toe off/knee flex          Ambulation with dual task                    Modalities                                    "

## 2024-04-10 ENCOUNTER — APPOINTMENT (OUTPATIENT)
Dept: PHYSICAL THERAPY | Facility: CLINIC | Age: 64
End: 2024-04-10
Payer: MEDICARE

## 2024-04-15 ENCOUNTER — OFFICE VISIT (OUTPATIENT)
Dept: PHYSICAL THERAPY | Facility: CLINIC | Age: 64
End: 2024-04-15
Payer: MEDICARE

## 2024-04-15 DIAGNOSIS — S83.241D TEAR OF MEDIAL MENISCUS OF RIGHT KNEE, CURRENT, UNSPECIFIED TEAR TYPE, SUBSEQUENT ENCOUNTER: ICD-10-CM

## 2024-04-15 DIAGNOSIS — M17.31 POST-TRAUMATIC OSTEOARTHRITIS OF RIGHT KNEE: Primary | ICD-10-CM

## 2024-04-15 PROCEDURE — 97110 THERAPEUTIC EXERCISES: CPT | Performed by: PHYSICAL THERAPIST

## 2024-04-15 PROCEDURE — 97112 NEUROMUSCULAR REEDUCATION: CPT | Performed by: PHYSICAL THERAPIST

## 2024-04-15 NOTE — PROGRESS NOTES
Daily Note     Today's date: 4/15/2024  Patient name: Analisa Reyez  : 1960  MRN: [id number]  Referring provider: Faizan Garcia, *  Dx:   Encounter Diagnosis     ICD-10-CM    1. Post-traumatic osteoarthritis of right knee  M17.31       2. Tear of medial meniscus of right knee, current, unspecified tear type, subsequent encounter  S83.919E                      Subjective: Patient reports she is feeling much better - feeling confident to transition to an I HEP next week.          Objective: See treatment diary below      Assessment:   Stage: chronic   Stability of Symptoms:  At Evaluation: stable - unchanged  Global Rating of Change:   Symptom Irritability Level: high  Primary Movement Diagnosis: poor motor control  Primary Pain Phenotype: nociplastic  Patient Specific Functional Scale:   23: return to walking 1/4 without compensations 0/10, able to negotiate curbs without pain 0/10, function without worrying about the knee 0/10; Total 0/30   FOTO Prediction: 64 by visit 12  FOTO Progress: 50 at evaluation   Greatest Concern: lack of mobility  Current Activity Plan: added to HEP ()  Current Educational Needs: progressions, confidence    Patient continues to respond well to manual treatment.  She demonstrated good form throughout treatment.  She is making good progress with PT.  Skilled PT continues to be required to guide progression of hip strength to allow her to perform all functional mobility tasks with less conscious thought.       Plan: Continue with POC - monitor tolerance to treatment - progress as able NV       Daily Treatment Diary     DX: (R) Knee OA - meniscus tear  EPOC: 24  Follow Up with Referring Provider:   Precautions: NA  CO-MORBIDITIES: HTN  HEP ACCESS CODE: [id number]           Treatment Diary  3/25 4/1 4/8 4/15     Manual Therapy         (R) Knee PROM Seated  8 min Seated  8 min Seated  8 min Seated  8 min     (R) Ankle Mobs                                       "                Therapeutic Exercise  HEP         Recumbent Bike  6 min 6 min 6 min 6 min     Elliptical                     SLR 12/14 2# 20x ea 2# 20x ea  2# 20x ea     S/L Hip ABD 12/14 2# 20x ea 2# 20x ea  2# 20x ea     Bridge 12/14 5\"x20 5\"x20  5\"x20               LAQ          Standing Gastroc Stretch          RB Taps                    Neuromuscular Reeducation          Foam Side Stepping                    FWD Mini Lunge  20x ea 20x ea       LAT Mini Lunge          Mini Squat  20x 20x  20x               FWD Step Up  8\" Step Up and Over  20x ea 8\" Step  Up and Over  20x ea  8\" Step  Up and Over  20x ea     LAT Step Up  8\" Step  Up and Over  20x ea 8\" Step Up and Over  20x ea  8\" Step  Up and Over  20x ea               TB Counter Balance                    Therapeutic Activity                              Gait Training          Step  thru w/ toe off/knee flex          Ambulation with dual task                    Modalities                                    "

## 2024-04-17 ENCOUNTER — APPOINTMENT (OUTPATIENT)
Dept: PHYSICAL THERAPY | Facility: CLINIC | Age: 64
End: 2024-04-17
Payer: MEDICARE

## 2024-04-22 ENCOUNTER — APPOINTMENT (OUTPATIENT)
Dept: PHYSICAL THERAPY | Facility: CLINIC | Age: 64
End: 2024-04-22
Payer: MEDICARE

## 2024-06-11 ENCOUNTER — TELEPHONE (OUTPATIENT)
Dept: FAMILY MEDICINE CLINIC | Facility: CLINIC | Age: 64
End: 2024-06-11

## 2025-01-24 ENCOUNTER — OFFICE VISIT (OUTPATIENT)
Dept: OBGYN CLINIC | Facility: CLINIC | Age: 65
End: 2025-01-24
Payer: MEDICARE

## 2025-01-24 VITALS — WEIGHT: 155 LBS | HEIGHT: 64 IN | BODY MASS INDEX: 26.46 KG/M2

## 2025-01-24 DIAGNOSIS — S83.241D TEAR OF MEDIAL MENISCUS OF RIGHT KNEE, CURRENT, UNSPECIFIED TEAR TYPE, SUBSEQUENT ENCOUNTER: ICD-10-CM

## 2025-01-24 DIAGNOSIS — M71.21 BAKER'S CYST OF KNEE, RIGHT: Primary | ICD-10-CM

## 2025-01-24 DIAGNOSIS — M25.561 RIGHT KNEE PAIN, UNSPECIFIED CHRONICITY: ICD-10-CM

## 2025-01-24 DIAGNOSIS — M17.31 POST-TRAUMATIC OSTEOARTHRITIS OF RIGHT KNEE: ICD-10-CM

## 2025-01-24 PROCEDURE — 99213 OFFICE O/P EST LOW 20 MIN: CPT | Performed by: STUDENT IN AN ORGANIZED HEALTH CARE EDUCATION/TRAINING PROGRAM

## 2025-01-24 NOTE — PROGRESS NOTES
Knee Follow up Office Note    Assessment:     1. Baker's cyst of knee, right    2. Post-traumatic osteoarthritis of right knee    3. Tear of medial meniscus of right knee, current, unspecified tear type, subsequent encounter    4. Right knee pain, unspecified chronicity          Plan:     Problem List Items Addressed This Visit    None  Visit Diagnoses         Baker's cyst of knee, right    -  Primary    Relevant Orders    Ambulatory Referral to Orthopedic Surgery      Post-traumatic osteoarthritis of right knee          Tear of medial meniscus of right knee, current, unspecified tear type, subsequent encounter          Right knee pain, unspecified chronicity                 64 y.o. female with a Baker's cyst of her right knee causing her limited range of motion and swelling.  She has no pain on exam today.  We discussed that she has a palpable Baker's cyst which we feel is the cause of her limitations in range of motion.  We discussed treatment option of aspiration and cortisone injection of Baker's cyst under ultrasound with Dr. Lubin.  Referral placed today.  If her symptoms continue, she may follow up in the office to discuss other treatment options.    Subjective:     Patient ID: Analisa Reyez is a 64 y.o. female.  Chief Complaint:  HPI:  64 y.o. female who presents today for a follow up evaluation of chronic right knee pain.  She has a history of Right knee OA with medial meniscus tear.  She was given a cortisone injection into the right knee which helped with her symptoms for a while.  She also attending PT last year, however she felt that she plateaued.  Since that time she has not had significant pain in her right knee, however her main complaint today is swelling and limited range of motion.  She takes Aleve occasionally for pain.       Allergy:  Allergies   Allergen Reactions    Sulfa Antibiotics Other (See Comments)     Felt something wrong with her face     Medications:  all current active  "meds have been reviewed  Past Medical History:  Past Medical History:   Diagnosis Date    Arthritis 2020    Hypertension      Past Surgical History:  Past Surgical History:   Procedure Laterality Date    BREAST SURGERY  2018    Right breast     Family History:  Family History   Problem Relation Age of Onset    Breast cancer Mother     Hypertension Father     Stroke Father      Social History:  Social History     Substance and Sexual Activity   Alcohol Use Not Currently     Social History     Substance and Sexual Activity   Drug Use Never     Social History     Tobacco Use   Smoking Status Never   Smokeless Tobacco Never           ROS:  General: Per HPI  Skin: Negative, except if noted below  HEENT: Negative  Respiratory: Negative  Cardiovascular: Negative  Gastrointestinal: Negative  Urinary: Negative  Vascular: Negative  Musculoskeletal: Positive per HPI   Neurologic: Positive per HPI  Endocrine: Negative    Objective:  BP Readings from Last 1 Encounters:   03/06/24 145/89      Wt Readings from Last 1 Encounters:   01/24/25 70.3 kg (155 lb)        Respiratory:   non-labored respirations    Lymphatics:  no palpable lymph nodes    Gait:   Antalgic     Neurologic:   Alert and oriented times 3  Patient with normal sensation except as noted below  Deep tendon reflexes 2+ except as noted in MSK exam    Bilateral Lower Extremity:  Right knee:      Inspection:  skin intact    Overall limb alignment varus    Effusion: negative, +Baker's cyst    ROM 5-110 deg flexion with pain    Extensor Lag: negative    Palpation: medial Joint line tenderness to palpation    AP Stability at 90 deg stable    M/L stability in full extension stable    M/L stability in midflexion stable    Motor: 5/5 Q/HS/TA/GS/P    Pulses: 2+ DP / 2+ PT    SILT DP/SP/S/S/TN      BMI:   Estimated body mass index is 26.61 kg/m² as calculated from the following:    Height as of this encounter: 5' 4\" (1.626 m).    Weight as of this encounter: 70.3 kg (155 " "lb).  BSA:   Estimated body surface area is 1.76 meters squared as calculated from the following:    Height as of this encounter: 5' 4\" (1.626 m).    Weight as of this encounter: 70.3 kg (155 lb).             Scribe Attestation      I,:  Tammie Guardado PA-C am acting as a scribe while in the presence of the attending physician.:       I,:  Faizan Garcia, DO personally performed the services described in this documentation    as scribed in my presence.:               "

## 2025-01-28 ENCOUNTER — TELEPHONE (OUTPATIENT)
Age: 65
End: 2025-01-28

## 2025-01-28 NOTE — TELEPHONE ENCOUNTER
Caller: Patient     Doctor: Efrain     Reason for call: Patient booked herself on mychart for an Ultrasound aspiration for this Friday in [facility] and wanted to make sure it was set up correctly     Call back#: [phone]

## 2025-02-03 NOTE — PROGRESS NOTES
1. Weakness of right leg  EMG 2 limb lower extremity    Ambulatory Referral to Neurology    MRI brain wo contrast    MRI lumbar spine wo contrast    CANCELED: MRI lumbar spine wo contrast    CANCELED: MRI brain wo contrast      2. Muscle spasticity  EMG 2 limb lower extremity    Ambulatory Referral to Neurology    MRI brain wo contrast    MRI lumbar spine wo contrast    CANCELED: MRI lumbar spine wo contrast    CANCELED: MRI brain wo contrast      3. History of breast cancer  MRI brain wo contrast    MRI lumbar spine wo contrast        Orders Placed This Encounter   Procedures    Large joint arthrocentesis    MRI brain wo contrast    MRI lumbar spine wo contrast    Ambulatory Referral to Neurology    EMG 2 limb lower extremity        IMAGING STUDIES: (I personally reviewed images in PACS and report):         PAST REPORTS:    MRI Right Knee Outside Facility August 2023:  Report unavailable  Moderate medial OA, moderate to severe PFOA        X-ray knee 4+ views right  No acute osseous abnormality.   Mild osteoarthritis with narrowing of the medial tibiofemoral joint and small osteophytes seen.      ASSESSMENT/PLAN:  Complex Right Leg Complaint    Weakness of Right Leg  Spasticity of the right quadriceps   Concomitant Moderate Medial and Severe PFOA on mri  No appreciable Baker's cyst appreciated on examination    I review of previous imaging studies, and bedside ultrasonography conducted today to assess for area for aspiration and injection.Large joint corticosteroid of the right knee provided to help with exacerbation of knee discomfort.    Secondary to weakness appreciated on examination, will order MRI brain as well as MRI lumbar spine and EMG for further evaluation of central cause of weakness.  Patient to follow-up with our office for review of imaging studies once completed.    PMHx  History of Breast Cancer     Repeat X-ray next visit: None    Return for Follow-up after MRI is completed for review, Follow-up  "with specialist.    Patient instructions below verbally summarized in person during encounter:  Patient Instructions   I explained the patient that she has 2 separate issues for which she presents today including both right knee anterior pain intermittent associated with her moderate to severe patellofemoral arthritis as well as moderate medial compartment arthritis with meniscal tear found on previous MRI.;  However, today there was no Baker's cyst found on ultrasound evaluation.    In addition patient also complains of right leg spasticity and has difficulty flexing her knee due to weakness of the hamstring.  I explained that currently her weakness and spasticity is of low is of unclear etiology or cause.  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  As such I recommended further investigation including additional MRIs for the lumbar spine and of the brain to evaluate for possible neurological cause.      __________________________________________________________________________    HISTORY OF PRESENT ILLNESS:    [facility] with working diagnosis of Baker's cyst of right knee close posttraumatic osteoarthritis of the right knee and a tear of the medial meniscus of the right knee.    Based off of review of electronic medical record, patient was counseled regarding potential ultrasound-guided aspiration and cortisone injection of Baker's cyst given to the palpable nature of her Baker's cyst which is limiting her range of motion.    Patient describes that she has been having symptomology for approximately 1 year.  She describes difficulty with ambulation that is most notable when she walks longer distances on flat ground in which she will describe that she feels that her leg \"locks up\".    She endorses that she has been previously evaluated for lower back pain as well as hip " "pain and has undergone EMG studies which were grossly unremarkable and states that she has also had an MRI of her knee which she describes as showing a \"frayed meniscus\".  On review of electronic medical record, it appears MRI imaging is consistent with patellofemoral arthritis. she does describe that she has had a fall with a direct strike to the front of her knee/patella approximately 3 years ago but has had no recent injury since.    She endorses a significant amount of limitation in the utilization of her right lower extremity with need of using both of her hands to extend her leg or to bend her leg at her knee joint.  She states that this has been worsening over the last year with most notable and worsening of symptomology over the last 6 months.    No fevers or chills.  Patient states that pain is well-controlled at this time.  No gross swelling or deformity.  No overlying rashes or discolorations.  Patient denies any back pain or hip pain.    Patient does report a remote history of breast cancer when inquired about past medical conditions.    Patient states that her pain is not of her main concern today but does feel that the sensation of her legs \"giving out\" and \"locking up\" are the main things that give her concern today.      Review of Systems      Following history reviewed and update:    Past Medical History:   Diagnosis Date    Arthritis 2020    Hypertension      Past Surgical History:   Procedure Laterality Date    BREAST SURGERY  2018    Right breast     Social History   Social History     Substance and Sexual Activity   Alcohol Use Not Currently     Social History     Substance and Sexual Activity   Drug Use Never     Social History     Tobacco Use   Smoking Status Never   Smokeless Tobacco Never     Family History   Problem Relation Age of Onset    Breast cancer Mother     Hypertension Father     Stroke Father      Allergies   Allergen Reactions    Sulfa Antibiotics Other (See Comments)     Felt " something wrong with her face          Physical Exam  There were no vitals taken for this visit.        Ortho Exam  RIGHT KNEE:  Erythema: no  Swelling: no  Increased Warmth: no  Tenderness: none  Flexion: intact, significant resistance in motion/flexion at the knee joint with appreciated spasticity of the right quadriceps muscle group.  With both patient prone as well as supine, patient is unable to provide significant strength (appreciated as 2+ out of 5 with knee flexion with no palpable deformity appreciated along the length of the hamstring musculature of the posterior thigh.  Extension: intact, patient prefers to leave leg in full extension and is unable to extend without the utilization of her hands.  Patellar Displacement: negative  Patellar Tilt: negative  Patellar Apprehension: negative  Patellar Grind Collins's: negative  Lachman's: negative  Drawer: negative  Varus laxity: negative  Valgus laxity: negative  [facility]: negative   Thessaly Test: negative  Dial Test: negative    - Patient has significant weakness as well as a significant amount of body contortion in order to flex the right hip.  - No muscle atrophy along the distribution of the right quadriceps muscle group appreciated.    Gait: Patient walks with leg and full extension during both swing and loading phase of ambulation with significant gait abnormality appreciated.  It appears that patient is attempting to swing hips in order to compensate with lifting of the right leg.  This is presumed to be in conjunction with the appreciated difficulty with flexion and extension as listed above.    __________________________________________________________________________  Large joint arthrocentesis: R supra patellar bursa  Universal Protocol:  Consent: Verbal consent obtained.  Risks and benefits: risks, benefits and alternatives were discussed  Consent given by: patient  Patient understanding: patient states understanding of the procedure being  performed  Patient consent: the patient's understanding of the procedure matches consent given  Site marked: the operative site was marked  Radiology Images displayed and confirmed. If images not available, report reviewed: imaging studies available  Required items: required blood products, implants, devices, and special equipment available  Patient identity confirmed: verbally with patient  Supporting Documentation  Indications: pain   Procedure Details  Location: knee - R supra patellar bursa  Preparation: Patient was prepped and draped in the usual sterile fashion  Needle size: 22 G  Ultrasound guidance: yes (Linear transducer with aseptic approach and technique.)  Approach: Superior lateral.  Medications administered: 4 mL lidocaine 1 %; 40 mg triamcinolone acetonide 40 mg/mL    Patient tolerance: patient tolerated the procedure well with no immediate complications  Dressing:  Sterile dressing applied

## 2025-02-03 NOTE — PATIENT INSTRUCTIONS
I explained the patient that she has 2 separate issues for which she presents today including both right knee anterior pain intermittent associated with her moderate to severe patellofemoral arthritis as well as moderate medial compartment arthritis with meniscal tear found on previous MRI.;  However, today there was no Baker's cyst found on ultrasound evaluation.    In addition patient also complains of right leg spasticity and has difficulty flexing her knee due to weakness of the hamstring.  I explained that currently her weakness and spasticity is of low is of unclear etiology or cause.  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  As such I recommended further investigation including additional MRIs for the lumbar spine and of the brain to evaluate for possible neurological cause.

## 2025-02-04 ENCOUNTER — PROCEDURE VISIT (OUTPATIENT)
Dept: OBGYN CLINIC | Facility: OTHER | Age: 65
End: 2025-02-04
Payer: MEDICARE

## 2025-02-04 DIAGNOSIS — R29.898 WEAKNESS OF RIGHT LEG: Primary | ICD-10-CM

## 2025-02-04 DIAGNOSIS — M62.838 MUSCLE SPASTICITY: ICD-10-CM

## 2025-02-04 DIAGNOSIS — Z85.3 HISTORY OF BREAST CANCER: ICD-10-CM

## 2025-02-04 DIAGNOSIS — M25.561 RIGHT KNEE PAIN, UNSPECIFIED CHRONICITY: ICD-10-CM

## 2025-02-04 DIAGNOSIS — M17.11 PATELLOFEMORAL ARTHRITIS OF RIGHT KNEE: ICD-10-CM

## 2025-02-04 DIAGNOSIS — M71.21 BAKER'S CYST OF KNEE, RIGHT: ICD-10-CM

## 2025-02-04 PROCEDURE — 20611 DRAIN/INJ JOINT/BURSA W/US: CPT

## 2025-02-04 PROCEDURE — 99204 OFFICE O/P NEW MOD 45 MIN: CPT | Performed by: FAMILY MEDICINE

## 2025-02-04 RX ADMIN — TRIAMCINOLONE ACETONIDE 40 MG: 40 INJECTION, SUSPENSION INTRA-ARTICULAR; INTRAMUSCULAR at 07:30

## 2025-02-04 RX ADMIN — LIDOCAINE HYDROCHLORIDE 4 ML: 10 INJECTION, SOLUTION INFILTRATION; PERINEURAL at 07:30

## 2025-02-07 RX ORDER — TRIAMCINOLONE ACETONIDE 40 MG/ML
40 INJECTION, SUSPENSION INTRA-ARTICULAR; INTRAMUSCULAR
Status: COMPLETED | OUTPATIENT
Start: 2025-02-04 | End: 2025-02-04

## 2025-02-07 RX ORDER — LIDOCAINE HYDROCHLORIDE 10 MG/ML
4 INJECTION, SOLUTION INFILTRATION; PERINEURAL
Status: COMPLETED | OUTPATIENT
Start: 2025-02-04 | End: 2025-02-04

## 2025-02-23 ENCOUNTER — APPOINTMENT (OUTPATIENT)
Dept: MRI IMAGING | Facility: HOSPITAL | Age: 65
End: 2025-02-23
Payer: MEDICARE

## 2025-02-23 ENCOUNTER — HOSPITAL ENCOUNTER (OUTPATIENT)
Dept: MRI IMAGING | Facility: HOSPITAL | Age: 65
Discharge: HOME | End: 2025-02-23
Payer: MEDICARE

## 2025-02-23 DIAGNOSIS — M62.838 MUSCLE SPASTICITY: ICD-10-CM

## 2025-02-23 DIAGNOSIS — Z85.3 HISTORY OF BREAST CANCER: ICD-10-CM

## 2025-02-23 DIAGNOSIS — R29.898 WEAKNESS OF RIGHT LEG: ICD-10-CM

## 2025-02-23 PROCEDURE — 72148 MRI LUMBAR SPINE W/O DYE: CPT

## 2025-02-28 ENCOUNTER — HOSPITAL ENCOUNTER (OUTPATIENT)
Dept: NEUROLOGY | Facility: CLINIC | Age: 65
End: 2025-02-28
Payer: MEDICARE

## 2025-02-28 DIAGNOSIS — M62.838 MUSCLE SPASTICITY: ICD-10-CM

## 2025-02-28 DIAGNOSIS — R29.898 WEAKNESS OF RIGHT LEG: ICD-10-CM

## 2025-02-28 PROCEDURE — 95886 MUSC TEST DONE W/N TEST COMP: CPT

## 2025-02-28 PROCEDURE — 95910 NRV CNDJ TEST 7-8 STUDIES: CPT

## 2025-03-06 ENCOUNTER — TELEPHONE (OUTPATIENT)
Age: 65
End: 2025-03-06

## 2025-03-06 NOTE — TELEPHONE ENCOUNTER
Caller: patient    Doctor: Dr. Lubin     Reason for call: patient reviewed her results from MRI and EMG that she recently got done, patient thinks PT will be the best decision for her unless Dr thinks differently. Patient is requesting a referral to be put in for PT   Please advise     Call back#: [phone]

## 2025-03-07 DIAGNOSIS — M17.11 PATELLOFEMORAL ARTHRITIS OF RIGHT KNEE: ICD-10-CM

## 2025-03-07 DIAGNOSIS — R29.898 WEAKNESS OF RIGHT LEG: Primary | ICD-10-CM

## 2025-03-07 DIAGNOSIS — M62.838 MUSCLE SPASTICITY: ICD-10-CM

## 2025-03-07 NOTE — TELEPHONE ENCOUNTER
PT RX ordered. She is to follow-up for review of mri lumbar. She is also go have mri brain performed.

## 2025-03-11 ENCOUNTER — TELEPHONE (OUTPATIENT)
Age: 65
End: 2025-03-11

## 2025-03-12 ENCOUNTER — OFFICE VISIT (OUTPATIENT)
Age: 65
End: 2025-03-12
Payer: MEDICARE

## 2025-03-12 VITALS — BODY MASS INDEX: 26.46 KG/M2 | HEIGHT: 64 IN | WEIGHT: 155 LBS

## 2025-03-12 DIAGNOSIS — M54.6 CHRONIC RIGHT-SIDED THORACIC BACK PAIN: ICD-10-CM

## 2025-03-12 DIAGNOSIS — G89.29 CHRONIC RIGHT-SIDED THORACIC BACK PAIN: ICD-10-CM

## 2025-03-12 DIAGNOSIS — M99.04 SACRAL REGION SOMATIC DYSFUNCTION: ICD-10-CM

## 2025-03-12 DIAGNOSIS — M79.10 MYALGIA: ICD-10-CM

## 2025-03-12 DIAGNOSIS — M99.03 SEGMENTAL DYSFUNCTION OF LUMBAR REGION: Primary | ICD-10-CM

## 2025-03-12 DIAGNOSIS — M99.02 SEGMENTAL DYSFUNCTION OF THORACIC REGION: ICD-10-CM

## 2025-03-12 DIAGNOSIS — M54.16 RIGHT LUMBAR RADICULOPATHY: ICD-10-CM

## 2025-03-12 PROBLEM — M53.3 SACROILIAC JOINT DYSFUNCTION: Status: ACTIVE | Noted: 2025-03-12

## 2025-03-12 PROBLEM — M79.18 MYOFASCIAL PAIN ON RIGHT SIDE: Status: ACTIVE | Noted: 2025-03-12

## 2025-03-12 PROCEDURE — 98941 CHIROPRACT MANJ 3-4 REGIONS: CPT | Performed by: CHIROPRACTOR

## 2025-03-12 PROCEDURE — 99203 OFFICE O/P NEW LOW 30 MIN: CPT | Performed by: CHIROPRACTOR

## 2025-03-12 NOTE — PROGRESS NOTES
Initial chiropractic visit--March 12, 2025, visit #1    Acute corrective treatment         1. Segmental dysfunction of lumbar region        2. Right lumbar radiculopathy        3. Sacral region somatic dysfunction        4. Myalgia        5. Segmental dysfunction of thoracic region        6. Chronic right-sided thoracic back pain          Assessment/Plan:    Review of Diagnostic Studies and Office Notes:    The patient's February 23, 2025 MRI report of the lumbar spine, August 22, 2024 ultrasound breast left complete (no sonographic evidence of malignancy), December 8, 2023 x-ray bone length scanogram report, February 28, 2025 EMG of the lower extremity report, Cecil Lubin,  February 4, 2025 orthopedic office note (weakness of right leg, muscle spasticity, history of breast cancer, right knee pain, patellofemoral arthritis of right knee, Baker's cyst of right knee January 24, 2025 orthopedic office note (Baker's cyst of right knee, posttraumatic osteoarthritis of right knee, tear of medial meniscus of right knee, right knee pain), April 15, 2024 physical therapy note (posttraumatic osteoarthritis of right knee, tear of medial meniscus of right knee) were reviewed prior to the chiropractic evaluation today.    Narrative & Impression   MRI LUMBAR SPINE WITHOUT CONTRAST     INDICATION: R29.898: Other symptoms and signs involving the musculoskeletal system  M62.838: Other muscle spasm  Z85.3: Personal history of malignant neoplasm of breast.     COMPARISON: 8/18/2023     TECHNIQUE:  Multiplanar, multisequence imaging of the lumbar spine was performed. .        IMAGE QUALITY:  Diagnostic     FINDINGS:     VERTEBRAL BODIES:  There are 5 lumbar type vertebral bodies. Grade 1 anterior spondylolisthesis of L4 in relation to L5. No spondylolysis. There is mild levoscoliosis of the lumbar spine from L2-L5. Normal marrow signal is identified within the   visualized bony structures.  No discrete marrow lesion.      SACRUM:  Normal signal within the sacrum. No evidence of insufficiency or stress fracture.     DISTAL CORD AND CONUS: There is a focus of abnormal signal seen within the lower thoracic cord at the level of the T11 inferior endplate as well as a second slightly larger focus of abnormal signal at the level of the L1 superior endplate. These were not   clearly identified on the limited outside examination and likely represent foci of demyelination within the spinal cord.     PARASPINAL SOFT TISSUES:  Paraspinal soft tissues are unremarkable.     LOWER THORACIC DISC SPACES:  Normal disc height and signal.  No disc herniation, canal stenosis or foraminal narrowing.     LUMBAR DISC SPACES:     L1-L2: Tiny right paramedian disc herniation without canal stenosis or foraminal narrowing.     L2-L3: Mild circumferential annular bulging with a small right paramedian disc protrusion. No canal stenosis or foraminal nerve impingement.     L3-L4: Mild annular bulging slightly asymmetric towards the right where there is a small broad-based foraminal and extraforaminal disc protrusion and asymmetric endplate hypertrophic osteophyte formation. Minimal right lateral canal stenosis and   foraminal narrowing without clear nerve impingement.     L4-L5: Mild circumferential annular bulging with a small right foraminal disc protrusion. Mild bilateral facet arthropathy. No canal stenosis. Mild right foraminal narrowing.     L5-S1: Minor annular bulging without discrete disc herniation, canal stenosis or foraminal narrowing.     OTHER FINDINGS:  None.     IMPRESSION:     There are 2 small foci of increased signal within the lower thoracic cord on sagittal T2/inversion recovery imaging suggestive of demyelinating foci. No prior imaging of the brain, cervical or thoracic spine for comparison. Recommend imaging of the brain   and neurology consultation to exclude demyelinating disease.     Noncompressive lumbar degenerative change at multiple  levels. Mild right foraminal narrowing at the L3-4 and L4-5 levels.     The study was marked in EPIC for significant notification.        Workstation performed: [id number]     Narrative & Impression   LEG LENGTH STUDY     INDICATION:   M25.561: Pain in right knee  Z01.89: Encounter for other specified special examinations.     COMPARISON:  None     VIEWS:  XR BONE LENGTH (SCANOGRAM)  5 images     FINDINGS:     No fractures or pathologic lesions. No significant degenerative changes.     Bone length measurements are as follows:     Right femur from femoral head to medial femoral condyle = 47.78 cm     Left femur from femoral head to medial femoral condyle = 47.34 cm     Right tibia from medial tibial plateau to the tibial plafond = 35.78 cm     Left tibia from medial tibial plateau to the tibial plafond = 35.95 cm     Total right leg length (including knee joint space) from femoral head to tibial plafond = 83.64 cm     Total left leg length (including knee joint space) from femoral head to tibial plafond = 83.99 cm     IMPRESSION:     Leg length measurements as above.        Workstation performed: [id number]        Abnormal study. These electrodiagnostic findings provide evidence of a lumbar polyradiculopathy affecting the L2-L4 nerve roots on the right, without ongoing denervation. There is no electrodiagnostic evidence of a focal neuropathy in the legs.     Medical Decision Making and Treatment Plan:    The patient indicates having symptoms on the course of the right S1 dermatome.  The patient does not have positive nerve root tension findings on orthopedic testing.  There is no central canal stenosis or neuroforaminal stenosis at the L5-S1 level and there is no impingement of the right S1 nerve root noted on the MRI report of the lumbar spine.  The patient has a spastic rigidity in the right quadriceps making right knee flexion difficult.  The patient therefore walks with an altered gait as her right knee is not  "bending.  Therefore the patient altered gait is mechanically creating abnormal stress on the right gluteal musculature.  The patient has shortening of the right gluteal musculature and therefore can have entrapment of the sciatic nerve at the right piriformis causing the right posterior lower extremity symptoms.  The abnormal gait can also lead to dysfunction in the sacroiliac joint and lumbar facet joints. The patient has myofascial findings as well as lumbar facet restrictions, thoracic facet restrictions and the right sacroiliac joint restriction which can contribute to limited range of motion and secondary pain.  The patient has significant weakness in the right hip flexors, right ankle dorsiflexor, and bilateral EHL.  The patient's EMG of the right lower extremity stated there is \"a lumbar polyradiculopathy affecting the L2-L4 nerve roots on the right, without ongoing denervation.  There is no electrodiagnostic evidence of a focal neuropathy in the legs.\"  The patient is MRI report of the lumbar spine states, \"Noncompressive lumbar degenerative change at multiple levels. Mild right foraminal narrowing at the L3-4 and L4-5 levels.\" Therefore the significant weakness noted in the right hip flexors, ankle dorsiflexion, and bilateral EHL do not correlate with nerve root impingement at the L2-3, L3-4, or L4-5 levels.  However the patient's MRI report of the lumbar spine also states, \"There are 2 small foci of increased signal within the lower thoracic cord on sagittal T2/inversion recovery imaging suggestive of demyelinating foci. No prior imaging of the brain, cervical or thoracic spine for comparison. Recommend imaging of the brain and neurology consultation to exclude demyelinating disease.\"  The patient was encouraged to schedule the MRI of the brain and follow-up with the neurologist.  An order for a neurologist was already placed by Dr. Lubin.    The patient will be treated with conservative chiropractic " care including myofascial release, joint mobilization, and a home stretching and icing program.    The patient was taught lower back and unassisted gluteus medius/piriformis stretches today. The patient was advised to do the stretch for 3 sets of 15-20 seconds several times per day.The need to stretch to the point of tension only was discussed. ROM was measured with an [employer] digital dual inclinometer.    The patient will initially be seen 2 times per week and the frequency of treatment will be reduced to 1 time per week as there are decreased symptoms and improvement in objective findings. The patient will then be discharged.    Patient Instructions:    The patient was advised to apply ice to the region in 15-minute intervals a minimum of 3 times per day.   The patient was advised to avoid sleeping in the prone and right side lying positions. Proper sleeping postures and use of pillows were discussed.The patient was informed that she may experience additional soreness following the today's treatment visit. The need to continue with the stretching exercises and apply ice in 15-minute intervals was discussed with the patient.   The patient was advised to discontinue toe-touch stretching exercises.  She was encouraged to follow-up with the neurologist and also schedule the MRI of the brain.    Goals:    JMLGOALS: Improve patient's ability to tolerate prolonged walking    TIME/Reviewed with Patient:  At least 30 minutes of time was spent with the patient during the consultation including the patient's history/current complaints, physical exam, reviewing the diagnostic and imaging reports/office notes, reviewing home instruction/reducing risk factors with the patient, reviewing my findings/diagnostic and imaging reports with the patient, and discussing the treatment/treatment plan with the patient.    This is a separate identifiable portion of today's visit from the E and M code billed.    Treatment today consisted of  "myofascial release via ischemic compression to the right lumbar erector spinae, quadratus lumborum, gluteus medius, and piriformis.     Manipulation was performed to the right sacroiliac joint via Toro low force drop technique. Contact was made to the right ischial tuberosity and the right lateral aspect of the  apex of the sacrum. Manipulation was performed to the lumbar restrictions via manual lumbar flexion distraction technique. Manipulation was performed to the thoracic restrictions and right sacroiliac joint restriction (downside extension technique) via grade 2 mobilization techniques and low force diversified maneuvers.  No high velocity thrust was applied.  An audible release occurred with inflation of the thoracic restrictions and was well-tolerated.    PNF stretching was performed to the right gluteus medius and piriformis.    Subjective:      Analisa Reyez is a 64 y.o. female who presents today with pain in the right thoracolumbar/lumbar region, right sacroiliac region, right gluteal region, and right posterior lower extremity.  The patient confirmed that she is under the care of Cecil Lubin DO (orthopedic surgery).  The patient confirmed that she had an injection in her right knee and stated her right knee is feeling \"way better\" since the cortisone injection.  She reported that she is currently in physical therapy to strengthen the right knee and hip regions.  The patient reported that she feels as though her right knee/lower extremity symptoms have caused her to have an altered gait and therefore her back feels \"completely out of whack.\"  She stated, \"I'm forcing this side\" meaning the right side of her back/pelvis.  The patient indicated she feels as though her right shoulder and pelvis are rotated forward.  She stated her symptoms began approximately 1 year ago. She denied having any incident or trauma that caused the pain.  Instead it was a gradual onset of symptoms.  Over that " "period of time she stated, sometimes I am better, sometimes I am worse.\"  The patient stated she feels \"twisted.\"  She stated she sometimes will \"drag my leg.\"  The patient acknowledges that she does not bend her right knee when she is walking.  She stated this is partly because she has difficulty bending her right knee but also she feels as though she guards the right knee.  The patient denied experiencing paresthesias in the lower extremities.  She states she has decreased back/pelvic symptoms with stretching and confirmed that she does toe-touch stretching exercises.  She denied experiencing changes in bowel or bladder function. The patient described her pain level as a 1 on a 0-10 pain scale today.  On her intake paperwork she described her pain level as a 1 on a 0-10 pain scale at its worst.  The patient confirmed that she had an EMG study of the right lower extremity and an MRI of the lumbar spine.  The patient is aware that she was referred for an MRI of the brain as well as for a neurology review of a evaluation.  However she stated that she did not schedule the MRI of the brain as she did not feel it was necessary.  She also did not schedule the appointment with the neurologist.     Objective:    Vitals:    03/12/25 0908   Weight: 70.3 kg (155 lb)   Height: 5' 4\" (1.626 m)     Appearance: Well developed, well nourished, and in no acute distress    Alert and oriented x 3    Mood/Affect: calm and pleasant    Gait/Posture:    Gait: The patient maintains knee extension when walking.  The patient states she has to avoid guarding the region.    Antalgic posture: Normal    Lordosis: Lumbar- normal    Kyphosis: Thoracic- normal    Lower extremity reflexes:    Deep tendon reflexes were normal and symmetrical in the bilateral lower extremities.    Lower Extremity Muscle Testing:    Muscle testing of the left hip flexor, bilateral hip abductors, bilateral hip adductors, bilateral ankle plantar flexors, left ankle " dorsiflexors was normal and graded +5/5.  Weakness was noted in the right hip flexor, right ankle dorsiflexor, and bilateral EHL and therefore were graded +4/5.    Sensory:    Sensation to light touch was normal and symmetrical in the bilateral lower extremities.    Babinski was negative bilaterally.    Lumbar Orthopedic Tests:    Seated Straight Leg Raise was negative  bilaterally.    [place] Test was positive bilaterally.    Active Lumbar Ranges of Motion:    Flexion: Allowed the patient to touch the ankles    Extension: 33 degrees    Right lateral flexion: 23 degrees    Left Lateral flexion: 24 degrees    Palpation:  Spastic rigidity of the right quadriceps is noted as it is difficult to passively flex the right knee.  Negative [employer] on the right. Active myofascial trigger points were present in the right lower thoracic/thoracolumbar erector spinae, right lumbar erector spinae, quadratus lumborum, right piriformis and gluteus medius. Pressure to the above listed trigger points reproduced some of the pain described in today's chief complaint. An L2 left vertebral body malposition subluxation and a L5 right vertebral body malposition subluxation were present.  T11 and T12 right vertebral body malposition subluxation were present.  A right anterior inferior sacral subluxation was present. Hypertonicity and tenderness were present in the right gluteus medius adjacent to the right sacroiliac joint. Hypertonicity and tenderness were present in the right L5 paraspinal muscles, left L2 paraspinal muscles, and T11 and T12 paraspinal muscles.       Dictation voice to text software has been used in the creation of this document. Please consider this in light of any contextual or grammatical errors.

## 2025-03-12 NOTE — PATIENT INSTRUCTIONS
The patient was advised to apply ice to the region in 15-minute intervals a minimum of 3 times per day.   The patient was advised to avoid sleeping in the prone and right side lying positions. Proper sleeping postures and use of pillows were discussed.The patient was informed that she may experience additional soreness following the today's treatment visit. The need to continue with the stretching exercises and apply ice in 15-minute intervals was discussed with the patient.

## 2025-03-13 ENCOUNTER — TELEPHONE (OUTPATIENT)
Age: 65
End: 2025-03-13

## 2025-03-13 NOTE — TELEPHONE ENCOUNTER
Caller: patient     Doctor: Dr. Lubin    Reason for call: requesting MRI referral to be faxed to [phone]   Please call patient once that is complete       Call back#: [phone]

## 2025-03-13 NOTE — TELEPHONE ENCOUNTER
Offered appt for follow up on 3/14-declined will f/u on 3/18.  Only wants 1 appt next week.   Cancelled f/u on 3/20.

## 2025-03-17 NOTE — TELEPHONE ENCOUNTER
Caller: Patient     Doctor: Dr. Lubin    Reason for call: Patient will be stopping into the office this afternoon to pickup her PT referral and MRI order.

## 2025-03-20 ENCOUNTER — PROCEDURE VISIT (OUTPATIENT)
Age: 65
End: 2025-03-20
Payer: MEDICARE

## 2025-03-20 VITALS — BODY MASS INDEX: 26.46 KG/M2 | HEIGHT: 64 IN | WEIGHT: 155 LBS

## 2025-03-20 DIAGNOSIS — M99.04 SACRAL REGION SOMATIC DYSFUNCTION: ICD-10-CM

## 2025-03-20 DIAGNOSIS — M54.6 CHRONIC RIGHT-SIDED THORACIC BACK PAIN: ICD-10-CM

## 2025-03-20 DIAGNOSIS — M54.16 RIGHT LUMBAR RADICULOPATHY: ICD-10-CM

## 2025-03-20 DIAGNOSIS — G89.29 CHRONIC RIGHT-SIDED THORACIC BACK PAIN: ICD-10-CM

## 2025-03-20 DIAGNOSIS — M79.10 MYALGIA: ICD-10-CM

## 2025-03-20 DIAGNOSIS — M99.02 SEGMENTAL DYSFUNCTION OF THORACIC REGION: ICD-10-CM

## 2025-03-20 DIAGNOSIS — M99.03 SEGMENTAL DYSFUNCTION OF LUMBAR REGION: Primary | ICD-10-CM

## 2025-03-20 PROCEDURE — 98941 CHIROPRACT MANJ 3-4 REGIONS: CPT | Performed by: CHIROPRACTOR

## 2025-03-20 NOTE — PROGRESS NOTES
"Initial chiropractic visit--March 12, 2025, visit #2    Acute corrective treatment    Assessment:  The patient is progressing as expected.    1. Segmental dysfunction of lumbar region        2. Right lumbar radiculopathy        3. Sacral region somatic dysfunction        4. Myalgia        5. Segmental dysfunction of thoracic region        6. Chronic right-sided thoracic back pain          Plan:  Treatment today consisted of myofascial release via Graston Technique to the right thoracolumbar/lumbar erector spinae, quadratus lumborum, and right gluteus medius (superficial).  GT 3 was used.  Myofascial release via ischemic compression was performed to the right gluteus medius and piriformis.     Manipulation was performed to the right sacroiliac joint via Toro low force drop technique. Contact was made to the right ischial tuberosity and the right lateral aspect of the  apex of the sacrum. Manipulation was performed to the lumbar restrictions via manual lumbar flexion distraction technique. Manipulation was performed to the thoracic restrictions and right sacroiliac joint restriction (downside extension technique) via grade 2 mobilization techniques and low force diversified maneuvers.  No high velocity thrust was applied.  An audible release occurred and was well-tolerated.    PNF stretching was performed to the right gluteus medius and piriformis.    Subjective:  The patient reported feeling better when compared to the initial chiropractic evaluation/treatment.  She stated she feels she is standing more upright and \"taller.\"  She states she has been doing the prescribed stretching exercises as instructed.  The patient reported that she feels as though her fascia is tense and \"pulling down.\" She has pain in the right thoracolumbar/lumbar region, right sacroiliac region, right gluteal region, and right posterior lower extremity.  She reported that she begins physical therapy for her right knee next week.  She also " reported that she had the MRI of the brain that was prescribed by her physician.  The patient does not bend her right knee when she is walking.  She has decreased back/pelvic symptoms with stretching and confirmed that she does toe-touch stretching exercises.  Despite reporting feeling better when compared to the initial chiropractic evaluation/treatment the patient described her pain level as a 2 on a 0-10 pain scale today which is slightly higher than the initial visit.  The patient intends to schedule an appointment with the neurologist.     Objective:  Spastic rigidity of the right quadriceps is noted as it is difficult to passively flex the right knee.  Negative [place] on the right. Active myofascial trigger points were present in the right lower thoracic/thoracolumbar erector spinae, right lumbar erector spinae, quadratus lumborum, right piriformis and gluteus medius. An L2 left vertebral body malposition subluxation and a L5 right vertebral body malposition subluxation were present.  T11 and T12 right vertebral body malposition subluxation were present.  A right anterior inferior sacral subluxation was present. Hypertonicity and tenderness were present in the right gluteus medius adjacent to the right sacroiliac joint. Hypertonicity and tenderness were present in the right L5 paraspinal muscles, left L2 paraspinal muscles, and T11 and T12 paraspinal muscles.       I have used the Epic copy/forward function to compose this note.  I have reviewed my current note to ensure it reflects the current patient status, exam, assessment and plan.    Dictation voice to text software has been used in the creation of this document. Please consider this in light of any contextual or grammatical errors.

## 2025-03-20 NOTE — PATIENT INSTRUCTIONS
The patient was advised to continue with the icing and stretching instructions.   The patient was informed that she may experience additional soreness following the today's treatment visit. The need to continue with the stretching exercises and apply ice in 15-minute intervals was discussed with the patient.   The patient was informed they may see redness or possible bruising in the region of Graston technique treatment.

## 2025-03-25 ENCOUNTER — PROCEDURE VISIT (OUTPATIENT)
Age: 65
End: 2025-03-25
Payer: MEDICARE

## 2025-03-25 VITALS — HEIGHT: 64 IN | BODY MASS INDEX: 26.46 KG/M2 | WEIGHT: 155 LBS

## 2025-03-25 DIAGNOSIS — M54.6 CHRONIC RIGHT-SIDED THORACIC BACK PAIN: ICD-10-CM

## 2025-03-25 DIAGNOSIS — M99.03 SEGMENTAL DYSFUNCTION OF LUMBAR REGION: Primary | ICD-10-CM

## 2025-03-25 DIAGNOSIS — M54.16 RIGHT LUMBAR RADICULOPATHY: ICD-10-CM

## 2025-03-25 DIAGNOSIS — G89.29 CHRONIC RIGHT-SIDED THORACIC BACK PAIN: ICD-10-CM

## 2025-03-25 DIAGNOSIS — M99.02 SEGMENTAL DYSFUNCTION OF THORACIC REGION: ICD-10-CM

## 2025-03-25 DIAGNOSIS — M99.04 SACRAL REGION SOMATIC DYSFUNCTION: ICD-10-CM

## 2025-03-25 DIAGNOSIS — M79.10 MYALGIA: ICD-10-CM

## 2025-03-25 PROCEDURE — 98941 CHIROPRACT MANJ 3-4 REGIONS: CPT | Performed by: CHIROPRACTOR

## 2025-03-26 ENCOUNTER — TELEPHONE (OUTPATIENT)
Age: 65
End: 2025-03-26

## 2025-03-26 NOTE — TELEPHONE ENCOUNTER
Caller: Patient    Doctor: Dr. Lubin    Reason for call: The patient stated she hurt her back a few days ago. She went to [facility] for the pain. [facility] stated they needed a referral from a doctor to have a the xray. The patient is questioning if Dr Lubin would place the referral? The patient questioned if Dr Lubin would contact her to discuss the results of her 2/23/25 MRI?    Call back#: [phone]

## 2025-03-26 NOTE — PROGRESS NOTES
Initial chiropractic visit--March 12, 2025, visit #3    Acute corrective treatment    Assessment:  The patient is progressing as expected.  The patient indicated having a fall onto the left buttocks but denies having any bruising in the gluteal region and no visible bruising is noted in the thoracic or lumbar region.  The patient was referred for a DEXA scan bone density test in November, 2023 but never had the study performed.  Referring the patient for x-rays of the lumbar spine was discussed with the patient today as she can be prone to compression fracture if she had poor bone density.  The patient indicated that she was able to brace herself as she fell to the floor and therefore may not have struck hard enough to cause a compression deformity.  Nonetheless if the patient has persistent back pain that is not responding to care she will be referred for additional x-rays of the lumbar spine to rule out compression deformity or pelvic findings.  The patient was in agreement with this.    1. Segmental dysfunction of lumbar region        2. Right lumbar radiculopathy        3. Sacral region somatic dysfunction        4. Myalgia        5. Segmental dysfunction of thoracic region        6. Chronic right-sided thoracic back pain          Plan:  Treatment today consisted of myofascial release via Graston Technique to the bilateral thoracolumbar/lumbar erector spinae, quadratus lumborum, and right gluteus medius (superficial to the left).  GT 3 was used.  Myofascial release via ischemic compression was performed to the right gluteus medius and piriformis.     Manipulation was performed to the right sacroiliac joint via Toro low force drop technique. Contact was made to the right ischial tuberosity and the right lateral aspect of the  apex of the sacrum. Manipulation was performed to the lumbar restrictions via manual lumbar flexion distraction technique. Manipulation was performed to the thoracic restrictions via  grade 1 mobilization techniques in the prone position.  No high velocity thrust was applied.    PNF stretching was performed to the bilateral gluteus medius and piriformis.    Subjective:  The patient reported feeling new onset of left thoracolumbar/lumbar and left gluteal soreness/pain.  The patient stated that she had been feeling better following her last chiropractic visit and noticed a definite improvement in the right thoracolumbar/lumbar and gluteal symptoms.  However she stated that she fell on her buttocks in her kitchen on Sunday.  The patient reported that she was trying to avoid spilling her dog's water bowl and as she stepped around it she lost her balance and fell.  She stated she grabbed onto a chair and was able to soften her fall but landed on her left buttocks in the seated type of position.  The patient reported that she has subsequent left thoracolumbar/lumbar and gluteal discomfort.  She states she does not have any bruising in the left gluteal region and her pain in the left thoracolumbar/lumbar and left gluteal region have subsided somewhat since Sunday.  The patient stated she took 1 Aleve today and is feeling much better.  She has pain in the bilateral thoracolumbar/lumbar region, right sacroiliac region, bilateral gluteal region, and right posterior lower extremity.  The patient does not bend her right knee when she is walking.  She has decreased back/pelvic symptoms with stretching and confirmed that she does toe-touch stretching exercises.  The patient described her pain level as a 4 on a 0-10 pain scale today.    Objective:  Spastic rigidity of the right quadriceps is noted as it is difficult to passively flex the right knee.  Negative Derefield on the right. Active myofascial trigger points were present in the right lower thoracic erector spinae, bilateral thoracolumbar erector spinae, bilateral lumbar erector spinae, quadratus lumborum, bilateral piriformis and gluteus medius. An L2  left vertebral body malposition subluxation and a L5 right vertebral body malposition subluxation were present.  T11 and T12 right vertebral body malposition subluxation were present.  A right anterior inferior sacral subluxation was present. Hypertonicity and tenderness were present in the right gluteus medius adjacent to the right sacroiliac joint. Hypertonicity and tenderness were present in the right L5 paraspinal muscles, left L2 paraspinal muscles, and T11 and T12 paraspinal muscles.       I have used the Epic copy/forward function to compose this note.  I have reviewed my current note to ensure it reflects the current patient status, exam, assessment and plan.    Dictation voice to text software has been used in the creation of this document. Please consider this in light of any contextual or grammatical errors.

## 2025-03-26 NOTE — TELEPHONE ENCOUNTER
Spoke to patient. Advised we should schedule her for FU as she wants to review her MRI and also had a new back injury. Advised if he feel she needs new xrays they can be done at the office. She did have a fall  onto her buttock 2 days ago, which jolted her back. She has seen her chiropractor yesterday who was not too concerned. She is getting some mid back spasms. No pain or numbness down the legs. Scheduled for tomorrow.

## 2025-03-27 ENCOUNTER — OFFICE VISIT (OUTPATIENT)
Dept: OBGYN CLINIC | Facility: OTHER | Age: 65
End: 2025-03-27
Payer: MEDICARE

## 2025-03-27 ENCOUNTER — APPOINTMENT (OUTPATIENT)
Dept: RADIOLOGY | Facility: OTHER | Age: 65
End: 2025-03-27
Payer: MEDICARE

## 2025-03-27 VITALS — HEIGHT: 64 IN | WEIGHT: 155 LBS | BODY MASS INDEX: 26.46 KG/M2

## 2025-03-27 DIAGNOSIS — M54.50 BILATERAL LOW BACK PAIN WITHOUT SCIATICA, UNSPECIFIED CHRONICITY: ICD-10-CM

## 2025-03-27 DIAGNOSIS — M25.571 RIGHT ANKLE PAIN, UNSPECIFIED CHRONICITY: ICD-10-CM

## 2025-03-27 DIAGNOSIS — R29.898 WEAKNESS OF RIGHT LEG: Primary | ICD-10-CM

## 2025-03-27 DIAGNOSIS — M62.838 MUSCLE SPASTICITY: ICD-10-CM

## 2025-03-27 DIAGNOSIS — M17.31 POST-TRAUMATIC OSTEOARTHRITIS OF RIGHT KNEE: ICD-10-CM

## 2025-03-27 PROCEDURE — 73610 X-RAY EXAM OF ANKLE: CPT

## 2025-03-27 PROCEDURE — 99214 OFFICE O/P EST MOD 30 MIN: CPT | Performed by: FAMILY MEDICINE

## 2025-03-27 RX ORDER — BACLOFEN 10 MG/1
10 TABLET ORAL
Qty: 30 TABLET | Refills: 2 | Status: SHIPPED | OUTPATIENT
Start: 2025-03-27

## 2025-03-27 NOTE — PROGRESS NOTES
1. Weakness of right leg        2. Right ankle pain, unspecified chronicity  XR ankle 3+ vw right      3. Bilateral low back pain without sciatica, unspecified chronicity  baclofen 10 mg tablet      4. Muscle spasticity        5. 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    Patient instructions below verbally summarized in person during encounter:  Patient Instructions   I explained the patient that she continues to have spasticity of the right lower extremity with difficulty bending the knee passively on examination as well as with weakness of the right foot with a dropfoot on walking gait.  I explained there are findings on her MRI of the lumbar spine as well as brain concerning for demyelinating disease such as multiple sclerosis I encouraged follow-up with neurologist for further evaluation.  She may return to orthopedics for evaluation of her knee as needed.  Patient expressed understanding and agreed to plan.      ____________________________________________________________________  IMAGING STUDIES: (I personally reviewed images in PACS and report):  X-ray right ankle 3/27/2025:  No acute os abnormality    PAST REPORTS:  MRI Lumbar Spine 2/23/25:  There are 2 small foci of increased signal within the lower thoracic cord on sagittal T2/inversion recovery imaging suggestive of demyelinating foci. No prior imaging of the brain, cervical or thoracic spine for comparison. Recommend imaging of the brain   and neurology consultation to exclude demyelinating disease.     Noncompressive lumbar degenerative change at multiple levels. Mild right foraminal narrowing at the  "L3-4 and L4-5 levels.     The study was marked in EPIC for significant notification.    MRI Brain 3/18/25:        MRI Right Knee Outside Facility August 2023:  Report unavailable  Moderate medial OA, moderate to severe PFOA        X-ray knee 4+ views right  No acute osseous abnormality.   Mild osteoarthritis with narrowing of the medial tibiofemoral joint and small osteophytes seen.      EMG lower extremity:  Lumbar polyradiculopathy    ____________________________________________________________________    2/4/25:  HISTORY OF PRESENT ILLNESS:    Patient evaluated by Dr. [facility] on 1/24/2025 with working diagnosis of Baker's cyst of right knee close posttraumatic osteoarthritis of the right knee and a tear of the medial meniscus of the right knee.    Based off of review of electronic medical record, patient was counseled regarding potential ultrasound-guided aspiration and cortisone injection of Baker's cyst given to the palpable nature of her Baker's cyst which is limiting her range of motion.    Patient describes that she has been having symptomology for approximately 1 year.  She describes difficulty with ambulation that is most notable when she walks longer distances on flat ground in which she will describe that she feels that her leg \"locks up\".    She endorses that she has been previously evaluated for lower back pain as well as hip pain and has undergone EMG studies which were grossly unremarkable and states that she has also had an MRI of her knee which she describes as showing a \"frayed meniscus\".  On review of electronic medical record, it appears MRI imaging is consistent with patellofemoral arthritis. she does describe that she has had a fall with a direct strike to the front of her knee/patella approximately 3 years ago but has had no recent injury since.    She endorses a significant amount of limitation in the utilization of her right lower extremity with need of using both of her " "hands to extend her leg or to bend her leg at her knee joint.  She states that this has been worsening over the last year with most notable and worsening of symptomology over the last 6 months.    No fevers or chills.  Patient states that pain is well-controlled at this time.  No gross swelling or deformity.  No overlying rashes or discolorations.  Patient denies any back pain or hip pain.    Patient does report a remote history of breast cancer when inquired about past medical conditions.    Patient states that her pain is not of her main concern today but does feel that the sensation of her legs \"giving out\" and \"locking up\" are the main things that give her concern today.    3/27/25:  Follow-up evaluation of right knee pain.  Significant improved after aspiration and corticosteroid injection last visit.  Denies any knee pain.    Follow-up evaluation of right leg spasticity.  Last visit it was noticed on examination that patient difficulty actively bending his knee and also spasticity was noted on knee flexion passively by physician.  As such workup was initiated including MRI lumbar spine as well as brain which revealed possible evidence of demyelinating disease.  Patient does have appointment scheduled with neurology next month per patient.    Today for patient's right leg spasticity she states this is continuous and chronic with no change.  She she does however endorse right foot weakness which correlates with her examination of weakness on her great toe extension and dorsiflexion with dropfoot on ambulatory gait.      Review of Systems      Following history reviewed and update:    Past Medical History:   Diagnosis Date    Arthritis 2020    Hypertension      Past Surgical History:   Procedure Laterality Date    BREAST SURGERY  2018    Right breast     Social History   Social History     Substance and Sexual Activity   Alcohol Use Not Currently     Social History     Substance and Sexual Activity   Drug Use " "Never     Social History     Tobacco Use   Smoking Status Never   Smokeless Tobacco Never     Family History   Problem Relation Age of Onset    Breast cancer Mother     Hypertension Father     Stroke Father      Allergies   Allergen Reactions    Sulfa Antibiotics Other (See Comments)     Felt something wrong with her face          Physical Exam  Ht 5' 4\" (1.626 m)   Wt 70.3 kg (155 lb)   BMI 26.61 kg/m²         Ortho Exam  RIGHT KNEE:  Tenderness: none  Flexion: Actively flexing the knee.  Passive flexion requires significant effort from physician due to spasticity  Extension: intact    BACK EXAM:  Gait: + Dropfoot gait right    STRENGTH (bilateral):  Foot Dorsiflexion: 3-/5  Great Toe Extension: 3/5  Foot Plantarflexion: 5/5  ____________________________________________________________________  Procedures        "

## 2025-03-27 NOTE — PATIENT INSTRUCTIONS
I explained the patient that she continues to have spasticity of the right lower extremity with difficulty bending the knee passively on examination as well as with weakness of the right foot with a dropfoot on walking gait.  I explained there are findings on her MRI of the lumbar spine as well as brain concerning for demyelinating disease such as multiple sclerosis I encouraged follow-up with neurologist for further evaluation.  She may return to orthopedics for evaluation of her knee as needed.  Patient expressed understanding and agreed to plan.

## 2025-04-07 ENCOUNTER — TELEPHONE (OUTPATIENT)
Dept: NEUROLOGY | Facility: CLINIC | Age: 65
End: 2025-04-07

## 2025-04-07 ENCOUNTER — EVALUATION (OUTPATIENT)
Dept: PHYSICAL THERAPY | Facility: CLINIC | Age: 65
End: 2025-04-07
Payer: MEDICARE

## 2025-04-07 DIAGNOSIS — M17.11 PATELLOFEMORAL ARTHRITIS OF RIGHT KNEE: ICD-10-CM

## 2025-04-07 DIAGNOSIS — M62.838 MUSCLE SPASTICITY: ICD-10-CM

## 2025-04-07 DIAGNOSIS — R29.898 WEAKNESS OF RIGHT LEG: ICD-10-CM

## 2025-04-07 PROCEDURE — 97161 PT EVAL LOW COMPLEX 20 MIN: CPT | Performed by: PHYSICAL THERAPIST

## 2025-04-07 PROCEDURE — 97140 MANUAL THERAPY 1/> REGIONS: CPT | Performed by: PHYSICAL THERAPIST

## 2025-04-07 PROCEDURE — 97530 THERAPEUTIC ACTIVITIES: CPT | Performed by: PHYSICAL THERAPIST

## 2025-04-07 NOTE — PROGRESS NOTES
PT Evaluation     Today's date: 2025  Patient name: Analisa Reyez  : 1960  MRN: [id number]  Referring provider: Cecil Lubin  Dx:   Encounter Diagnosis     ICD-10-CM    1. Weakness of right leg  R29.898 Ambulatory referral to Physical Therapy      2. Muscle spasticity  M62.838 Ambulatory referral to Physical Therapy      3. Patellofemoral arthritis of right knee  M17.11 Ambulatory referral to Physical Therapy                     Assessment  Impairments: abnormal or restricted ROM, activity intolerance, impaired physical strength and pain with function  Symptom irritability: low    Assessment details: Analisa Reyez is a 64 y.o. female presenting to outpatient physical therapy at [facility] with complaints of R knee pain, stiffness and weakness.  She presents with decreased range of motion, decreased strength, limited flexibility, poor postural awareness, poor body mechanics, altered gait pattern, poor balance, decreased tolerance to activity and decreased functional mobility due to Weakness of right leg  Muscle spasticity  Patellofemoral arthritis of right knee.  She would benefit from skilled PT services in order to address these deficits and reach maximum level of function.  Thank you for the referral!    Understanding of Dx/Px/POC: good     Prognosis: good    Goals  STG  1. Independent with HEP in 4 weeks  2. Decrease pain at worst by 50% in 4 weeks    LTG  1. Increase RLE strength in all planes to 5/5 in 8 weeks.   2. Return to full, pain-free and unrestricted standing, walking and stair climbing in 8 weeks.        Plan  Patient would benefit from: skilled physical therapy  Planned modality interventions: thermotherapy: hydrocollator packs    Planned therapy interventions: neuromuscular re-education, manual therapy, therapeutic exercise and therapeutic activities    Frequency: 2x week  Duration in weeks: 8  Treatment plan discussed with: patient      Subjective  Evaluation    History of Present Illness  Mechanism of injury: Pt reports chronic history R knee pain, stiffness and weakness which began after a bad fall many years ago. Pt states she just iced her knee, no surgery. Recent injection in R knee which has alleviated her pain, but she has begun to guard her knee with no flexion during gait from fear of hyper extension or giving out. No giving out reported since the shot.   Pain  Current pain ratin  At best pain ratin  At worst pain rating: 3  Location: anterolateral R knee  Quality: dull ache  Relieving factors: ice  Aggravating factors: walking, stair climbing and standing        Objective     Neurological Testing     Sensation     Knee   Left Knee   Intact: Light touch    Right Knee   Intact: light touch     Reflexes   Left   Patellar (L4): normal (2+)  Achilles (S1): normal (2+)    Right   Patellar (L4): normal (2+)  Achilles (S1): normal (2+)    Active Range of Motion     Right Knee   Flexion: 130 degrees   Extension: 0 degrees     Strength/Myotome Testing     Right Hip   Planes of Motion   Flexion: 4+  Extension: 4+  Abduction: 4+  Adduction: 4+    Right Knee   Flexion: 4  Extension: 4  Quadriceps contraction: fair    Right Ankle/Foot   Dorsiflexion: 4+  Plantar flexion: 4+    Tests     Right Knee   Positive anterior drawer.   Negative anterior Lachman, lateral Evin, medial Evin, posterior Lachman, Thessaly's test at 5 degrees, Thessaly's test at 20 degrees, valgus stress test at 0 degrees, valgus stress test at 30 degrees, varus stress test at 0 degrees and varus stress test at 30 degrees.             Daily Treatment Diary     CO-MORBIDITIES: fear avoidant behavior   HEP ACCESS CODE:   FOTO Completed On: 2025    POC Expires Reeval for Medicare to be completed  Unit Limit Auth Expiration Date PT/OT/STVisit Limit   2025 By visit 10 na na Bomn    Completed on visit                    Auth Status DATE         NA Visit # 1         Remaining          MANUAL THERAPY         R HS stretch NS                                                     THERAPEUTIC EXERCISE HEP         HR          TR          SLR 4 way          Clam           bridge          SAQ          Active HS stretch          LAQ          tailgaters          Slant board          Standing hip 3 way          Lat res walk          Lat step up          Leg ext & curl machine          Leg press          NEUROMUSCULAR REEDUCATION           Tandem walk          Airex lat walk          SLS           Bosu lunge                                                                                                              THERAPEUTIC ACTIVITY          Patient education: pathoanatomy, nature of sxs, POC, HEP  NS        Bike for Knee ROm  6' L1                            GAIT TRAINING                                                  MODALITIES

## 2025-04-15 ENCOUNTER — CONSULT (OUTPATIENT)
Dept: NEUROLOGY | Facility: CLINIC | Age: 65
End: 2025-04-15
Payer: MEDICARE

## 2025-04-15 VITALS
HEIGHT: 64 IN | WEIGHT: 155 LBS | DIASTOLIC BLOOD PRESSURE: 64 MMHG | OXYGEN SATURATION: 98 % | BODY MASS INDEX: 26.46 KG/M2 | SYSTOLIC BLOOD PRESSURE: 138 MMHG | TEMPERATURE: 98.2 F | HEART RATE: 78 BPM

## 2025-04-15 DIAGNOSIS — M62.838 MUSCLE SPASTICITY: ICD-10-CM

## 2025-04-15 DIAGNOSIS — R29.2 HYPERREFLEXIA: ICD-10-CM

## 2025-04-15 DIAGNOSIS — R29.898 WEAKNESS OF RIGHT LEG: ICD-10-CM

## 2025-04-15 DIAGNOSIS — R90.89 ABNORMAL BRAIN MRI: ICD-10-CM

## 2025-04-15 DIAGNOSIS — R93.7 ABNORMAL MRI, LUMBAR SPINE: Primary | ICD-10-CM

## 2025-04-15 PROCEDURE — 99203 OFFICE O/P NEW LOW 30 MIN: CPT | Performed by: NURSE PRACTITIONER

## 2025-04-15 RX ORDER — ACETAMINOPHEN 325 MG/1
160 TABLET ORAL EVERY 6 HOURS PRN
COMMUNITY

## 2025-04-15 NOTE — PROGRESS NOTES
Review of Systems   Constitutional:  Negative for appetite change, fatigue and fever.   HENT: Negative.  Negative for hearing loss, tinnitus, trouble swallowing and voice change.    Eyes: Negative.  Negative for photophobia, pain and visual disturbance.   Respiratory: Negative.  Negative for shortness of breath.    Cardiovascular: Negative.  Negative for palpitations.   Gastrointestinal: Negative.  Negative for nausea and vomiting.   Endocrine: Negative.  Negative for cold intolerance.   Genitourinary: Negative.  Negative for dysuria, frequency and urgency.   Musculoskeletal:  Positive for back pain (lumbar) and gait problem (balance issues- no falls). Negative for myalgias, neck pain and neck stiffness.   Skin: Negative.  Negative for rash.   Allergic/Immunologic: Negative.    Neurological:  Positive for weakness (right leg/ knee down). Negative for dizziness, tremors, seizures, syncope, facial asymmetry, speech difficulty, light-headedness, numbness and headaches.   Hematological: Negative.  Does not bruise/bleed easily.   Psychiatric/Behavioral: Negative.  Negative for confusion, hallucinations and sleep disturbance.    All other systems reviewed and are negative.

## 2025-04-15 NOTE — PROGRESS NOTES
Name: Analisa Reyez      : 1960      MRN: [id number]  Encounter Provider: YASMANI Pinzon  Encounter Date: 4/15/2025   Encounter department: NEUROLOGY [facility]  :  Assessment & Plan  Weakness of right leg  Patient noted over the past 5 to 6 years weakness in the distal right lower extremity and decreased range of motion.  She feels she can trip over the right foot at times and her knee will catch with walking.  She states at the time of onset she did have a bad fall and has had issues with the right knee since that time.  She does also admit to low back pain that radiates down the right leg for over a decade.    On exam she does have hyperreflexia, horizontal nystagmus in the right lateral gaze, and weakness of the right lower extremity.  Workup thus far of EMG did reveal lumbar polyradiculopathy L2-L4.  MRI lumbar spine did show increased signal in the lower thoracic cord that would be suggestive of a demyelinating foci.  MRI brain did show white matter changes more likely to be moderate chronic microvascular ischemic disease but ultimately nonspecific.      Given her symptoms along with exam findings, possible demyelinating foci noted on MRI lumbar spine.  I did advise patient to obtain MRI with without contrast of the rest of her neural axis-cervical and thoracic spine.  Also recommended lab workup due to her MRI findings.  Right lower extremity symptoms may be related to her lumbar spine, however ultimately need to rule out any underlying conditions such as MS contributing to her chronic right lower extremity complaints.  She does feel her condition is mainly due to inactivity and disuse   And therefore she will likely hold off on scheduling MRIs as she has had various imaging studies performed thus far.  I discussed the importance of obtaining further workup and possibly following up with MS specialist in the future.      Orders:    Ambulatory Referral to  Neurology    Muscle spasticity    Orders:    Ambulatory Referral to Neurology    MRI thoracic spine with and without contrast; Future    MRI cervical spine with and without contrast; Future          History of Present Illness   HPI   Patient is a 64 year old female with PMH hypertension, hyperlipidemia, who presents for evaluation of right lower extremity weakness.    She has had issues with low back pain for over decade, she would get pain radiating down the right leg at times, would extend from the buttock to the foot on the lateral aspect of the leg.   She has managed with chiropractor.   No numnbess/tingling in the legs.     She had a bad fall about 6 years ago and has had issues with the right knee since then. She will get cortisone injections twice. She will feel the knee will catch at times but has improved, at other times she feels will give out.   No recent falls related to this.     She feels the distal RLE is weaker and less ROM compared to the left, thinks this started after her fall 5-6 years ago. She can trip over the right foot at times.   Denies any symptoms in the proximal LE.   She feels she may have weak hamstrings.     She does feel some of her weakness is related to not being able to go to the gym as often as in the past.     She is currently in PT.     No bowel or bladder symptoms.     No family hx MS. No other family hx of neurologic disease.  Non smoker, no ETOH use.     Prior work up:    MRI brain w/o contrast 3/2025: 1.  No mass effect, recent infarct, intracranial hemorrhage, or hydrocephalus.   2.  Question of a chronic lacunar infarct of the right cerebral peduncle .   3.  Chronic small vessel infarct of the right frontal lobe   4.  Supratentorial white matter changes most commonly attributable to mild to   moderate chronic microvascular ischemic disease but ultimately nonspecific. The   differential diagnosis includes demyelinating disease, migraine headaches,   atypical infectious  "processes such as Lyme disease and prior insult among other   entities.   5.  Atrophy.   6.  If there is clinical concern for a structural lesion, recommend a contrast   enhanced MRI brain with, assuming no contraindication in this patient with a   documented history of breast cancer.     MRI lumbar spine 2/2025: There are 2 small foci of increased signal within the lower thoracic cord on sagittal T2/inversion recovery imaging suggestive of demyelinating foci. No prior imaging of the brain, cervical or thoracic spine for comparison. Recommend imaging of the brain   and neurology consultation to exclude demyelinating disease.     Noncompressive lumbar degenerative change at multiple levels. Mild right foraminal narrowing at the L3-4 and L4-5 levels.    Emg 2/2025: Abnormal study. These electrodiagnostic findings provide evidence of a lumbar polyradiculopathy affecting the L2-L4 nerve roots on the right, without ongoing denervation. There is no electrodiagnostic evidence of a focal neuropathy in the legs.     Review of Systems I have personally reviewed the MA's review of systems and made changes as necessary.         Objective   /64 (BP Location: Left arm, Patient Position: Sitting, Cuff Size: Adult)   Pulse 78   Temp 98.2 °F (36.8 °C) (Temporal)   Ht 5' 4\" (1.626 m)   Wt 70.3 kg (155 lb)   SpO2 98%   BMI 26.61 kg/m²     Physical Exam  Constitutional:       General: She is awake.   HENT:      Right Ear: Hearing normal.      Left Ear: Hearing normal.   Eyes:      General: Lids are normal.      Pupils: Pupils are equal, round, and reactive to light.   Neurological:      Mental Status: She is alert.      Deep Tendon Reflexes:      Reflex Scores:       Bicep reflexes are 2+ on the right side and 3+ on the left side.       Brachioradialis reflexes are 2+ on the right side and 3+ on the left side.       Patellar reflexes are 3+ on the right side and 3+ on the left side.       Achilles reflexes are 0 on the " right side and 0 on the left side.  Psychiatric:         Speech: Speech normal.       Neurological Exam  Mental Status  Awake and alert. Speech is normal. Language is fluent with no aphasia.    Cranial Nerves  CN II: Visual fields full to confrontation.  CN III, IV, VI: Normal lids and orbits bilaterally. Pupils equal round and reactive to light bilaterally. Horizontal nystagmus in the right lateral gaze.  CN V:  Right: Facial sensation is normal.  Left: Facial sensation is normal on the left.  CN VII:  Right: There is no facial weakness.  Left: There is no facial weakness.  CN VIII:  Right: Hearing is normal.  Left: Hearing is normal.  CN IX, X: Palate elevates symmetrically  CN XI:  Right: Trapezius strength is normal.  Left: Trapezius strength is normal.  CN XII: Tongue midline without atrophy or fasciculations.    Motor  Normal muscle bulk throughout.                                               Right                     Left  Elbow flexion                         5                          5  Elbow extension                    5                          5  Wrist flexion                           5                          5  Wrist extension                      5                          5  Finger abduction                    5                          5  Hip flexion                              2                          5  Knee flexion                           4                          5  Knee extension                      5                          5  Ankle inversion                      3                          5  Ankle eversion                       3                          5  Plantarflexion                         5                          5  Dorsiflexion                            3                          5    Sensory  Light touch is normal in upper and lower extremities. Pinprick abnormality: Normal in bilateral UE, diminished to the ankles in b/l LE  . Temperature abnormality: Normal in  bilateral UE, diminished to the ankles in b/l LE  . Vibration abnormality: 10 secs at the toes.     Reflexes                                            Right                      Left  Brachioradialis                    2+                         3+  Biceps                                 2+                         3+  Patellar                                3+                         3+  Achilles                                0                         0  Right Plantar: downgoing  Left Plantar: downgoing    Right pathological reflexes: Bud's absent. Ankle clonus absent.  Left pathological reflexes: Bud's absent. Ankle clonus absent.    Coordination  Right: Finger-to-nose normal.Left: Finger-to-nose normal.    Gait   Unable to rise from chair without using arms.  Antalgic .

## 2025-04-15 NOTE — PATIENT INSTRUCTIONS
Obtain mri thoracic and cervical spine  Wait on labs until I have have MS specialist look at your MRIs     Hemoglobin 7.0 HCT 20.0

## 2025-04-15 NOTE — ASSESSMENT & PLAN NOTE
Patient noted over the past 5 to 6 years weakness in the distal right lower extremity and decreased range of motion.  She feels she can trip over the right foot at times and her knee will catch with walking.  She states at the time of onset she did have a bad fall and has had issues with the right knee since that time.  She does also admit to low back pain that radiates down the right leg for over a decade.    On exam she does have hyperreflexia, horizontal nystagmus in the right lateral gaze, and weakness of the right lower extremity.  Workup thus far of EMG did reveal lumbar polyradiculopathy L2-L4.  MRI lumbar spine did show increased signal in the lower thoracic cord that would be suggestive of a demyelinating foci.  MRI brain did show white matter changes more likely to be moderate chronic microvascular ischemic disease but ultimately nonspecific.      Given her symptoms along with exam findings, possible demyelinating foci noted on MRI lumbar spine.  I did advise patient to obtain MRI with without contrast of the rest of her neural axis-cervical and thoracic spine.  Also recommended lab workup due to her MRI findings.  Right lower extremity symptoms may be related to her lumbar spine, however ultimately need to rule out any underlying conditions such as MS contributing to her chronic right lower extremity complaints.  She does feel her condition is mainly due to inactivity and disuse   And therefore she will likely hold off on scheduling MRIs as she has had various imaging studies performed thus far.  I discussed the importance of obtaining further workup and possibly following up with MS specialist in the future.      Orders:    Ambulatory Referral to Neurology     Detail Level: Detailed

## 2025-04-16 ENCOUNTER — OFFICE VISIT (OUTPATIENT)
Dept: PHYSICAL THERAPY | Facility: CLINIC | Age: 65
End: 2025-04-16
Attending: FAMILY MEDICINE
Payer: MEDICARE

## 2025-04-16 DIAGNOSIS — M62.838 MUSCLE SPASTICITY: ICD-10-CM

## 2025-04-16 DIAGNOSIS — R29.898 WEAKNESS OF RIGHT LEG: Primary | ICD-10-CM

## 2025-04-16 PROCEDURE — 97110 THERAPEUTIC EXERCISES: CPT

## 2025-04-16 PROCEDURE — 97140 MANUAL THERAPY 1/> REGIONS: CPT

## 2025-04-16 NOTE — PROGRESS NOTES
"Daily Note     Today's date: 2025  Patient name: Analisa Reyez  : 1960  MRN: [id number]  Referring provider: Cecil Lubin*  Dx:   Encounter Diagnosis     ICD-10-CM    1. Weakness of right leg  R29.898       2. Muscle spasticity  M62.838                      Subjective: Pt reports she has become reliant on holing onto furniture in the home when ambulating.  Reports since IE she has used the bike at the gym.      Objective: See treatment diary below      Assessment: Tolerated treatment fair. Patient demonstrated fatigue post treatment and would benefit from continued PT for knee ROM, LE strengthening and gait.  Pt insisted on holding the arm of therapist to walk around the gym.  Pt w/ tan for PROM nee flex but limits AROM.  Pt keeps her R LE in ext w/ gait.  Pt to brind SPC to NV for instruction of use.      Plan: Continue per plan of care.  Progress treatment as tolerated.       Daily Treatment Diary     CO-MORBIDITIES: fear avoidant behavior   HEP ACCESS CODE:   FOTO Completed On: 2025    POC Expires Reeval for Medicare to be completed  Unit Limit Auth Expiration Date PT/OT/STVisit Limit   2025 By visit 10 na na Bomn    Completed on visit                    Auth Status DATE        NA Visit # 1 2        Remaining         MANUAL THERAPY         R HS stretch NS JK       Knee PROM  JK                                           THERAPEUTIC EXERCISE HEP         HR   15       TR   15       SLR 4 way   10x2       Clam           bridge   10x2 3\"       SAQ          Active HS stretch            LAQ   10x5\"       tailgaters   1'       Slant board   1'       Standing hip 3 way   1.5lb 10x BL       Lat res walk          Lat step up          Leg ext & curl machine          Leg press          NEUROMUSCULAR REEDUCATION           Tandem walk          Airex lat walk          SLS           Bosu lunge                                                                                              "                 THERAPEUTIC ACTIVITY          Patient education: pathoanatomy, nature of sxs, POC, HEP  NS JK       Bike for Knee ROm  6' L1 6' L1                           GAIT TRAINING                                                  MODALITIES

## 2025-04-18 ENCOUNTER — OFFICE VISIT (OUTPATIENT)
Dept: PHYSICAL THERAPY | Facility: CLINIC | Age: 65
End: 2025-04-18
Attending: FAMILY MEDICINE
Payer: MEDICARE

## 2025-04-18 DIAGNOSIS — M17.11 PATELLOFEMORAL ARTHRITIS OF RIGHT KNEE: ICD-10-CM

## 2025-04-18 DIAGNOSIS — R29.898 WEAKNESS OF RIGHT LEG: Primary | ICD-10-CM

## 2025-04-18 DIAGNOSIS — M62.838 MUSCLE SPASTICITY: ICD-10-CM

## 2025-04-18 PROCEDURE — 97110 THERAPEUTIC EXERCISES: CPT

## 2025-04-18 PROCEDURE — 97140 MANUAL THERAPY 1/> REGIONS: CPT

## 2025-04-21 ENCOUNTER — OFFICE VISIT (OUTPATIENT)
Dept: PHYSICAL THERAPY | Facility: CLINIC | Age: 65
End: 2025-04-21
Attending: FAMILY MEDICINE
Payer: MEDICARE

## 2025-04-21 DIAGNOSIS — R29.898 WEAKNESS OF RIGHT LEG: Primary | ICD-10-CM

## 2025-04-21 DIAGNOSIS — M62.838 MUSCLE SPASTICITY: ICD-10-CM

## 2025-04-21 PROCEDURE — 97110 THERAPEUTIC EXERCISES: CPT

## 2025-04-21 PROCEDURE — 97140 MANUAL THERAPY 1/> REGIONS: CPT

## 2025-04-21 NOTE — PROGRESS NOTES
"Daily Note     Today's date: 2025  Patient name: Analisa Reyez  : 1960  MRN: [id number]  Referring provider: Cecil Lubin*  Dx:   Encounter Diagnosis     ICD-10-CM    1. Weakness of right leg  R29.898       2. Muscle spasticity  M62.838                      Subjective: Pt reports no c/o's post LV.  NO issues to reported from over the weekend regarding the R LE.  Pt c/o pain on the L QL.      Objective: See treatment diary below      Assessment: Tolerated treatment fair. Patient demonstrated fatigue post treatment and would benefit from continued PT for cotn'd LE strengthening and AROM motion to the R knee.  Pt has difficulties w/ keeping knee ext w/ standing ex's.  Pt w/ ability to use bike and flex w/o any issues but when performing ex's or ambulates she keeps her R knee locked into ext.  Pt seems to be unable to make the connection from her brain to her R LE.  Pt may benefit from psych consult.  Pt is unable to flex the knee if the hip is at neutral.  Pt can not lie prone and perform HS curl but can perform  a  hip ext.  Pt can not lie supine and perform heel slides from hip neutral.  But pt can ride bike w/ resistance w/o any issues.      Plan: Continue per plan of care.  Progress treatment as tolerated.       Daily Treatment Diary     CO-MORBIDITIES: fear avoidant behavior   HEP ACCESS CODE:   FOTO Completed On: 2025    POC Expires Reeval for Medicare to be completed  Unit Limit Auth Expiration Date PT/OT/STVisit Limit   2025 By visit 10 na na Bomn    Completed on visit                    Auth Status DATE      NA Visit # 1 2 3 4      Remaining         MANUAL THERAPY         R HS stretch NS JK       Knee PROM  JK JK jk                                         THERAPEUTIC EXERCISE HEP         HR   15 20 20     TR   15 20 20     SLR 4 way   10x2 Flex 1.5 lb 10x2 Flex 1.5 lb 10x2     Clam           bridge   10x2 3\" 10x2 3\" 10x2 3\"     SAQ          Active HS " "stretch            March in supine    10x 2 nv     March in standin alt    10x 10x     LAQ   10x5\" 1.5lb 10x2 BL 1.5lb 10x2 BL     tailgaters   1' 1'      Slant board   1' 1' x2 1'x2     Standing hip 3 way   1.5lb 10x BL 1.5lb 10x2 BL 1.5lb 10x2 BL     Lat res walk          Lat step up          Leg ext & curl machine          Knee ext w/ hip at 90 in supine    10x                          Leg press          NEUROMUSCULAR REEDUCATION           Tandem walk          Airex lat walk          SLS           Bosu lunge                                                                                                              THERAPEUTIC ACTIVITY          Patient education: pathoanatomy, nature of sxs, POC, HEP  NS JK  jk     Bike for Knee ROm  6' L1 6' L1 6' L1 8' L1                         GAIT TRAINING                                                  MODALITIES                                            "

## 2025-04-23 ENCOUNTER — OFFICE VISIT (OUTPATIENT)
Dept: PHYSICAL THERAPY | Facility: CLINIC | Age: 65
End: 2025-04-23
Attending: FAMILY MEDICINE
Payer: MEDICARE

## 2025-04-23 DIAGNOSIS — M62.838 MUSCLE SPASTICITY: ICD-10-CM

## 2025-04-23 DIAGNOSIS — M17.11 PATELLOFEMORAL ARTHRITIS OF RIGHT KNEE: ICD-10-CM

## 2025-04-23 DIAGNOSIS — R29.898 WEAKNESS OF RIGHT LEG: Primary | ICD-10-CM

## 2025-04-23 PROCEDURE — 97530 THERAPEUTIC ACTIVITIES: CPT

## 2025-04-23 PROCEDURE — 97110 THERAPEUTIC EXERCISES: CPT

## 2025-04-23 NOTE — PROGRESS NOTES
"Daily Note     Today's date: 2025  Patient name: Analisa Reyez  : 1960  MRN: [id number]  Referring provider: Cecil Lubin*  Dx:   Encounter Diagnosis     ICD-10-CM    1. Weakness of right leg  R29.898       2. Muscle spasticity  M62.838       3. Patellofemoral arthritis of right knee  M17.11                      Subjective: Pt reports this time of day is not good for her.  States she bought leg wts and they came in the mail, she wore them around the house causing fatigue.      Objective: See treatment diary below      Assessment: Tolerated treatment fair. Patient demonstrated fatigue post treatment and would benefit from continued PT for cont'd LE strengthening.  Pt is able to complete knee flexion when in a closed chain but is challenged when has an open chain.  Pt did improve this visit and was able to perform a SLR w/ A.  Pt seemed to do better w/ min input or A from therapist.      Plan: Continue per plan of care.  Progress treatment as tolerated.       Daily Treatment Diary     CO-MORBIDITIES: fear avoidant behavior   HEP ACCESS CODE:   FOTO Completed On: 2025    POC Expires Reeval for Medicare to be completed  Unit Limit Auth Expiration Date PT/OT/STVisit Limit   2025 By visit 10 na na Bomn    Completed on visit                    Auth Status DATE     NA Visit # 1 2 3 4 5     Remaining         MANUAL THERAPY         R HS stretch NS JK       Knee PROM  JK JK jk np                                        THERAPEUTIC EXERCISE HEP         HR   15 20 20 20    TR   15 20 20 20    SLR 4 way   10x2 Flex 1.5 lb 10x2 Flex 1.5 lb 10x2 Flex no wt 10x2    Clam           bridge   10x2 3\" 10x2 3\" 10x2 3\" 10x2 3\"    SAQ          Active HS stretch            March in supine    10x 2 nv 10x2    March in standin alt    10x 10x     LAQ   10x5\" 1.5lb 10x2 BL 1.5lb 10x2 BL 1.5lb 10x2 BL    tailgaters   1' 1'  1'    Slant board   1' 1' x2 1'x2 1'x2    Standing hip 3 way   " 1.5lb 10x BL 1.5lb 10x2 BL 1.5lb 10x2 BL 1.5lb 10x2 BL    Lat res walk          Lat step up          Leg ext & curl machine          Knee ext w/ hip at 90 in supine    10x                          Leg press          NEUROMUSCULAR REEDUCATION           Tandem walk          Airex lat walk          SLS           Bosu lunge                                                                                                              THERAPEUTIC ACTIVITY          Patient education: pathoanatomy, nature of sxs, POC, HEP  NS JK  jk JK    Bike for Knee ROm  6' L1 6' L1 6' L1 8' L1 7' L1                        GAIT TRAINING                                                  MODALITIES

## 2025-04-28 ENCOUNTER — OFFICE VISIT (OUTPATIENT)
Dept: PHYSICAL THERAPY | Facility: CLINIC | Age: 65
End: 2025-04-28
Attending: FAMILY MEDICINE
Payer: MEDICARE

## 2025-04-28 DIAGNOSIS — R29.898 WEAKNESS OF RIGHT LEG: Primary | ICD-10-CM

## 2025-04-28 DIAGNOSIS — M17.11 PATELLOFEMORAL ARTHRITIS OF RIGHT KNEE: ICD-10-CM

## 2025-04-28 DIAGNOSIS — M62.838 MUSCLE SPASTICITY: ICD-10-CM

## 2025-04-28 PROCEDURE — 97112 NEUROMUSCULAR REEDUCATION: CPT | Performed by: PHYSICAL THERAPIST

## 2025-04-28 PROCEDURE — 97110 THERAPEUTIC EXERCISES: CPT | Performed by: PHYSICAL THERAPIST

## 2025-04-28 PROCEDURE — 97530 THERAPEUTIC ACTIVITIES: CPT | Performed by: PHYSICAL THERAPIST

## 2025-04-28 NOTE — PROGRESS NOTES
"Daily Note     Today's date: 2025  Patient name: Analisa Reyez  : 1960  MRN: [id number]  Referring provider: Cecil Lubin*  Dx:   Encounter Diagnosis     ICD-10-CM    1. Weakness of right leg  R29.898       2. Muscle spasticity  M62.838       3. Patellofemoral arthritis of right knee  M17.11         Subjective: Pt states she thinks she strained her back over the weekend and is not having a  good day       Objective: See treatment diary below      Assessment: Tolerated treatment fair. Patient demonstrated fatigue post treatment and would benefit from continued PT for cont'd LE strengthening. Pt did improve this visit and was able to perform tailgaters and bridges with improved knee flexion.       Plan: Continue per plan of care.  Progress treatment as tolerated.       Daily Treatment Diary     CO-MORBIDITIES: fear avoidant behavior   HEP ACCESS CODE:   FOTO Completed On: 2025    POC Expires Reeval for Medicare to be completed  Unit Limit Auth Expiration Date PT/OT/STVisit Limit   2025 By visit 10 na na Bomn    Completed on visit                    Auth Status DATE     NA Visit # 5 6     Remaining      MANUAL THERAPY      R HS stretch      Knee PROM np     FOTO   !!!!                     THERAPEUTIC EXERCISE HEP      HR  20 20    TR  20 20    SLR 4 way  Flex 1.5 lb 10x2 Flex no wt 10x3    Clam        bridge  10x2 3\" 10x3 3\"    SAQ       Active HS stretch         March in supine  nv 10x2    March in standin alt  10x     LAQ  1.5lb 10x2 BL 2lb 10x2 BL    tailgaters  1' 1'    Slant board  1'x2 1'x2    Standing hip 3 way  1.5lb 10x2 BL 1.5lb 10x2 BL    Lat res walk       Lat step up       Leg ext & curl machine       Knee ext w/ hip at 90 in supine                     Leg press       NEUROMUSCULAR REEDUCATION        Tandem walk       Airex lat walk       SLS        Bosu lunge                                                                             THERAPEUTIC ACTIVITY     "   Patient education: pathoanatomy, nature of sxs, POC, HEP  JK NS    Bike for Knee ROm  8' L1 7' L2                  GAIT TRAINING                                   MODALITIES

## 2025-04-30 ENCOUNTER — APPOINTMENT (OUTPATIENT)
Dept: PHYSICAL THERAPY | Facility: CLINIC | Age: 65
End: 2025-04-30
Payer: MEDICARE

## 2025-05-01 ENCOUNTER — APPOINTMENT (OUTPATIENT)
Dept: PHYSICAL THERAPY | Facility: CLINIC | Age: 65
End: 2025-05-01
Attending: FAMILY MEDICINE
Payer: MEDICARE

## 2025-05-01 NOTE — PROGRESS NOTES
"Daily Note     Today's date: 2025  Patient name: Analisa Reyez  : 1960  MRN: [id number]  Referring provider: Cecil Lubin*  Dx:   Encounter Diagnosis     ICD-10-CM    1. Weakness of right leg  R29.898       2. Muscle spasticity  M62.838       3. Patellofemoral arthritis of right knee  M17.11                      Subjective: Pt presents w/ SPC and cont'd ambulation w/ locked R knee.   Pt reports using leg machines at the gym and loved them.      Objective: See treatment diary below      Assessment: Tolerated treatment fair.  Attempted gait training w/ SPC, pt had difficulty and seemed less safe.  Held use of cane.  Pt seems to limit knee flexion w/ gait due to fear and pt agrees. Patient would benefit from continued PT to work on LE strengthening to help pt's fear of ambulation.       Plan: Continue per plan of care.  Progress treatment as tolerated.  Work on LE strengthening and AAROM.  Add machines NV.     Daily Treatment Diary     CO-MORBIDITIES: fear avoidant behavior   HEP ACCESS CODE:   FOTO Completed On: 2025    POC Expires Reeval for Medicare to be completed  Unit Limit Auth Expiration Date PT/OT/STVisit Limit   2025 By visit 10 na na Bomn    Completed on visit                    Auth Status DATE       NA Visit # 1 2 3       Remaining         MANUAL THERAPY         R HS stretch NS JK       Knee PROM  JK JK                                          THERAPEUTIC EXERCISE HEP         HR   15 20      TR   15 20      SLR 4 way   10x2 Flex 1.5 lb 10x2      Clam           bridge   10x2 3\" 10x2 3\"      SAQ          Active HS stretch            March in supine    10x 2      March in standin alt    10x      LAQ   10x5\" 1.5lb 10x2 BL      tailgaters   1' 1'      Slant board   1' 1' x2      Standing hip 3 way   1.5lb 10x BL 1.5lb 10x2 BL      Lat res walk          Lat step up          Leg ext & curl machine          Knee ext w/ hip at 90 in supine    10x                    "       Leg press          NEUROMUSCULAR REEDUCATION           Tandem walk          Airex lat walk          SLS           Bosu lunge                                                                                                              THERAPEUTIC ACTIVITY          Patient education: pathoanatomy, nature of sxs, POC, HEP  NS JK       Bike for Knee ROm  6' L1 6' L1 6' L1                          GAIT TRAINING                                                  MODALITIES                                           [TextEntry] :   Constitutional: well developed, well nourished, no acute distress Psychiatric: appropriate affect, makes eye contact Cardiovascular: regular rate and rhythm Respiratory: symmetric respiratory effort Gastrointestinal: soft, nontender, nondistended, no palpable masses, no hernias Skin: no significant acne, no hirsutism, no acanthosis nigricans

## 2025-05-02 ENCOUNTER — TELEPHONE (OUTPATIENT)
Dept: NEUROLOGY | Facility: CLINIC | Age: 65
End: 2025-05-02

## 2025-05-02 DIAGNOSIS — Z86.73 HISTORY OF STROKE: Primary | ICD-10-CM

## 2025-05-02 DIAGNOSIS — I10 ESSENTIAL (PRIMARY) HYPERTENSION: ICD-10-CM

## 2025-05-02 RX ORDER — ATORVASTATIN CALCIUM 20 MG/1
20 TABLET, FILM COATED ORAL DAILY
Qty: 30 TABLET | Refills: 5 | Status: SHIPPED | OUTPATIENT
Start: 2025-05-02

## 2025-05-02 NOTE — TELEPHONE ENCOUNTER
Called and left message for patient to return call to review recommendations, I will attempt to call patient when I return to the office next week.    If she does call back please let her know I did have her MRIs reviewed by one of our MS specialist who did recommend her to be seen by her once her MRIs that I had ordered at her visit are completed.  She did state it was important for these MRIs to be done prior to the visit as they are available to review.        She did also review her MRI brain and did note a stroke, therefore she should start aspirin 81 mg daily along with atorvastatin 20 mg daily as a she she is not on a cholesterol medication.  I have also added a lipid panel to her blood work.  She should also complete stroke workup of echo and CTA head/neck to assess for anything else that could contribute to her stroke or cause future strokes.

## 2025-05-05 ENCOUNTER — OFFICE VISIT (OUTPATIENT)
Dept: PHYSICAL THERAPY | Facility: CLINIC | Age: 65
End: 2025-05-05
Attending: FAMILY MEDICINE
Payer: MEDICARE

## 2025-05-05 DIAGNOSIS — M17.11 PATELLOFEMORAL ARTHRITIS OF RIGHT KNEE: ICD-10-CM

## 2025-05-05 DIAGNOSIS — R29.898 WEAKNESS OF RIGHT LEG: Primary | ICD-10-CM

## 2025-05-05 DIAGNOSIS — M62.838 MUSCLE SPASTICITY: ICD-10-CM

## 2025-05-05 PROCEDURE — 97110 THERAPEUTIC EXERCISES: CPT

## 2025-05-05 PROCEDURE — 97530 THERAPEUTIC ACTIVITIES: CPT

## 2025-05-05 NOTE — TELEPHONE ENCOUNTER
Fadia - Will be happy to see the patient for further discussions/results review.    Benjamin - please follow with the patient to offer OVL visit with me whenever the patient is available.

## 2025-05-05 NOTE — PROGRESS NOTES
"Daily Note     Today's date: 2025  Patient name: Analisa Reyez  : 1960  MRN: [id number]  Referring provider: Cecil Lubin*  Dx:   Encounter Diagnosis     ICD-10-CM    1. Weakness of right leg  R29.898       2. Muscle spasticity  M62.838       3. Patellofemoral arthritis of right knee  M17.11                      Subjective: Pt reports she feel she was making progress.  States she is  a little more stiff today due to the weather possibly.      Objective: See treatment diary below      Assessment: Tolerated treatment fair. Patient demonstrated fatigue post treatment and would benefit from continued PT for con'td work on knee AROM, LE strengthening and gait.  Pt cont's to ambulate w/ R knee extended yet is able to perform step ups and use the bike a full revolution.      Plan: Continue per plan of care.  Progress treatment as tolerated.       Daily Treatment Diary     CO-MORBIDITIES: fear avoidant behavior   HEP ACCESS CODE:   FOTO Completed On: 2025, 25    POC Expires Reeval for Medicare to be completed  Unit Limit Auth Expiration Date PT/OT/STVisit Limit   2025 By visit 10 na na Bomn    Completed on visit                    Auth Status DATE    NA Visit # 5 6 7    Remaining      MANUAL THERAPY      R HS stretch      Knee PROM np     FOTO   JK                     THERAPEUTIC EXERCISE HEP      HR  20 20 20   TR  20 20 20   SLR 4 way  Flex 1.5 lb 10x2 Flex no wt 10x3 Flex no wt 10x3   Clam        bridge  10x2 3\" 10x3 3\" 10x2 3\"   SAQ       Active HS stretch         March in supine  nv 10x2 nv   March in standin alt  10x     LAQ  1.5lb 10x2 BL 2lb 10x2 BL 2lb 10x2 BL   tailgaters  1' 1' 1'   Slant board  1'x2 1'x2 1'   Standing hip 3 way  1.5lb 10x2 BL 1.5lb 10x2 BL 1.5lb 10x2 BL   Lat res walk       Lat step up       Leg ext & curl machine       Knee ext w/ hip at 90 in supine       Step up     6\" x20          Leg press       NEUROMUSCULAR REEDUCATION        Tandem " walk       Airex lat walk       SLS        Bosu lunge                                                                             THERAPEUTIC ACTIVITY       Patient education: pathoanatomy, nature of sxs, POC, HEP  JK NS    Bike for Knee ROm  8' L1 7' L2 7'L2                 GAIT TRAINING                                   MODALITIES

## 2025-05-05 NOTE — TELEPHONE ENCOUNTER
"Spoke with patient. She is still not interested in scheduling MRIs is very \"overwhelmed\" with all the tests she has already done, discussed importance of obtain MRI due to findings of possible demyelinating foci on lumbar MRI. She would reconsider if she doesn't make much progress on PT, she has number for scheduling.     Also discussed stroke noted on MRI brain-discussed starting ASA and statin in which she will consider but unsure if she will start, discussed risk for future strokes if she does not start medication. Also discussed obtaining echo and CTA for stroke work up which again she repeated with is \"overwhelmed\" with tests and would consider getting in the future, understands the risk for future strokes by not obtaining.     Discussed scheduling follow up appt to ensure she has follow up care in which she is not interested in scheduling at this time.    Dr. VILLEGAS. If she does get the tests done I will have her schedule follow up with you for further eval. Thank you for the recommendations.     Porter-she did get her bloodwork at [facility], could you please obtain results. Thanks!  "

## 2025-05-07 ENCOUNTER — OFFICE VISIT (OUTPATIENT)
Dept: PHYSICAL THERAPY | Facility: CLINIC | Age: 65
End: 2025-05-07
Attending: FAMILY MEDICINE
Payer: MEDICARE

## 2025-05-07 DIAGNOSIS — R29.898 WEAKNESS OF RIGHT LEG: Primary | ICD-10-CM

## 2025-05-07 DIAGNOSIS — M62.838 MUSCLE SPASTICITY: ICD-10-CM

## 2025-05-07 DIAGNOSIS — M17.11 PATELLOFEMORAL ARTHRITIS OF RIGHT KNEE: ICD-10-CM

## 2025-05-07 PROCEDURE — 97530 THERAPEUTIC ACTIVITIES: CPT

## 2025-05-07 PROCEDURE — 97110 THERAPEUTIC EXERCISES: CPT

## 2025-05-07 NOTE — PROGRESS NOTES
"Daily Note     Today's date: 2025  Patient name: Analisa Reyez  : 1960  MRN: [id number]  Referring provider: Cecil Lubin*  Dx:   Encounter Diagnosis     ICD-10-CM    1. Weakness of right leg  R29.898       2. Muscle spasticity  M62.838       3. Patellofemoral arthritis of right knee  M17.11                      Subjective: Pt reports she notice her R  HS feels diferently than her L and feels she needs to strengthen the L.      Objective: See treatment diary below      Assessment: Tolerated treatment fair. Patient demonstrated fatigue post treatment and would benefit from continued PT for cont'd work on LE strengthening, gait and endurance.  Pt able to tan HS and ext machine w/ knee flexed to 90.  Once pt is ambulating she walks w/ knee locked into ext.  Pt has to lift the leg when knee is flexed in order to elevate at the hip.      Plan: Progress treatment as tolerated.       Daily Treatment Diary     CO-MORBIDITIES: fear avoidant behavior   HEP ACCESS CODE:   FOTO Completed On: 2025, 25    POC Expires Reeval for Medicare to be completed  Unit Limit Auth Expiration Date PT/OT/STVisit Limit   2025 By visit 10 na na Bomn    Completed on visit                    Auth Status DATE    NA Visit # 5 6 7 8    Remaining       MANUAL THERAPY       R HS stretch       Knee PROM np      FOTO   JK                         THERAPEUTIC EXERCISE HEP       HR  20 20 20 Slt bd 20   TR  20 20 20 Slt bd 20   SLR 4 way  Flex 1.5 lb 10x2 Flex no wt 10x3 Flex no wt 10x3    Clam         bridge  10x2 3\" 10x3 3\" 10x2 3\"    SAQ        Active HS stretch          March in supine  nv 10x2 nv    March in standin alt  10x   20x   LAQ  1.5lb 10x2 BL 2lb 10x2 BL 2lb 10x2 BL    tailgaters  1' 1' 1'    Slant board  1'x2 1'x2 1' 1'   Standing hip 3 way  1.5lb 10x2 BL 1.5lb 10x2 BL 1.5lb 10x2 BL    Lat res walk        Lat step up        Leg ext machine     15lb 10x2 ea   Leg curl machine     10lb " "10x2 ea   Knee ext w/ hip at 90 in supine        Step up     6\" x20 6\" x20           Leg press     65lb 20x   NEUROMUSCULAR REEDUCATION         Tandem walk        Airex lat walk        SLS         Bosu lunge                                                                                        THERAPEUTIC ACTIVITY        Patient education: pathoanatomy, nature of sxs, POC, HEP  JK NS     Bike for Knee ROm  8' L1 7' L2 7'L2 8' L1                   GAIT TRAINING                                        MODALITIES                                            "

## 2025-05-12 ENCOUNTER — OFFICE VISIT (OUTPATIENT)
Dept: PHYSICAL THERAPY | Facility: CLINIC | Age: 65
End: 2025-05-12
Attending: FAMILY MEDICINE
Payer: MEDICARE

## 2025-05-12 DIAGNOSIS — M62.838 MUSCLE SPASTICITY: ICD-10-CM

## 2025-05-12 DIAGNOSIS — R29.898 WEAKNESS OF RIGHT LEG: Primary | ICD-10-CM

## 2025-05-12 PROCEDURE — 97530 THERAPEUTIC ACTIVITIES: CPT

## 2025-05-12 PROCEDURE — 97110 THERAPEUTIC EXERCISES: CPT

## 2025-05-12 NOTE — PROGRESS NOTES
"Daily Note     Today's date: 2025  Patient name: Analisa Reyez  : 1960  MRN: [id number]  Referring provider: Cecil Lubin*  Dx:   Encounter Diagnosis     ICD-10-CM    1. Weakness of right leg  R29.898       2. Muscle spasticity  M62.838                      Subjective: Pt reports she is fatigued this afternoon, due to it's to late in the day for her.  Pt states she felt good pos LV.      Objective: See treatment diary below      Assessment: Tolerated treatment fair. Patient demonstrated fatigue post treatment and would benefit from continued PT for cont'd LE strengthening and AROM in the knee. Pt can not lift the knee/hip to put her foot on the step but can push to raise up onto the step.      Plan: Continue per plan of care.  Progress treatment as tolerated.       Daily Treatment Diary     CO-MORBIDITIES: fear avoidant behavior   HEP ACCESS CODE:   FOTO Completed On: 2025, 25    POC Expires Reeval for Medicare to be completed  Unit Limit Auth Expiration Date PT/OT/STVisit Limit   2025 By visit 10 na na Bomn    Completed on visit                    Auth Status DATE    NA Visit # 5 6 7 8 9    Remaining        MANUAL THERAPY        R HS stretch        Knee PROM np       FOTO   JK                             THERAPEUTIC EXERCISE HEP        HR  20 20 20 Slt bd 20 Slt bd 20   TR  20 20 20 Slt bd 20 Slt bd 20   SLR 4 way  Flex 1.5 lb 10x2 Flex no wt 10x3 Flex no wt 10x3     Clam          bridge  10x2 3\" 10x3 3\" 10x2 3\"     SAQ         Active HS stretch           March in supine  nv 10x2 nv     March in standin alt  10x   20x 20x   LAQ  1.5lb 10x2 BL 2lb 10x2 BL 2lb 10x2 BL     tailgaters  1' 1' 1'     Slant board  1'x2 1'x2 1' 1' 1'   Standing hip 3 way  1.5lb 10x2 BL 1.5lb 10x2 BL 1.5lb 10x2 BL     Lat res walk         Lat step up         Leg ext machine     15lb 10x2 ea 15lb 10x2 ea   Leg curl machine     10lb 10x2 ea 10lb 10x2 ea   Knee ext w/ hip at 90 in " "supine         Step up     6\" x20 6\" x20             Leg press     65lb 20x 65lb 20x   NEUROMUSCULAR REEDUCATION          Tandem walk         Airex lat walk         SLS          Bosu lunge                                                                                                   THERAPEUTIC ACTIVITY         Patient education: pathoanatomy, nature of sxs, POC, HEP  JK NS      Bike for Knee ROm  8' L1 7' L2 7'L2 8' L1 8' L1                     GAIT TRAINING                                             MODALITIES                                                 "

## 2025-05-14 ENCOUNTER — APPOINTMENT (OUTPATIENT)
Dept: PHYSICAL THERAPY | Facility: CLINIC | Age: 65
End: 2025-05-14
Attending: FAMILY MEDICINE
Payer: MEDICARE

## 2025-05-19 ENCOUNTER — OFFICE VISIT (OUTPATIENT)
Dept: PHYSICAL THERAPY | Facility: CLINIC | Age: 65
End: 2025-05-19
Attending: FAMILY MEDICINE
Payer: MEDICARE

## 2025-05-19 DIAGNOSIS — M62.838 MUSCLE SPASTICITY: ICD-10-CM

## 2025-05-19 DIAGNOSIS — R29.898 WEAKNESS OF RIGHT LEG: Primary | ICD-10-CM

## 2025-05-19 DIAGNOSIS — M17.11 PATELLOFEMORAL ARTHRITIS OF RIGHT KNEE: ICD-10-CM

## 2025-05-19 PROCEDURE — 97110 THERAPEUTIC EXERCISES: CPT

## 2025-05-19 PROCEDURE — 97530 THERAPEUTIC ACTIVITIES: CPT

## 2025-05-19 NOTE — PROGRESS NOTES
"Daily Note     Today's date: 2025  Patient name: Analisa Reyez  : 1960  MRN: [id number]  Referring provider: Cecil Lubin*  Dx:   Encounter Diagnosis     ICD-10-CM    1. Weakness of right leg  R29.898       2. Muscle spasticity  M62.838       3. Patellofemoral arthritis of right knee  M17.11                      Subjective: Pt reports her knee was swollen and sore this AM.  States unsure why.  Pt states \"there wasn't even heat\".      Objective: See treatment diary below      Assessment: Tolerated treatment fair. Patient demonstrated fatigue post treatment and would benefit from continued PT for con'td work on AROM knee flexion, gait and LE strengthening.  Pt cont's to ambulates w/ TKE and when on carpet holds onto furniture stating a cane does not work for her.      Plan: Continue per plan of care.  RE NV.     Daily Treatment Diary     CO-MORBIDITIES: fear avoidant behavior   HEP ACCESS CODE:   FOTO Completed On: 2025, 25    POC Expires Reeval for Medicare to be completed  Unit Limit Auth Expiration Date PT/OT/STVisit Limit   2025 By visit 10 na na Bomn    Completed on visit                    Auth Status DATE    NA Visit # 5 6 7 8 9 10    Remaining         MANUAL THERAPY         R HS stretch         Knee PROM np        FOTO   JK                                 THERAPEUTIC EXERCISE HEP         HR  20 20 20 Slt bd 20 Slt bd 20 Slt bd 20   TR  20 20 20 Slt bd 20 Slt bd 20 Slt bd 20   SLR 4 way  Flex 1.5 lb 10x2 Flex no wt 10x3 Flex no wt 10x3      Clam           bridge  10x2 3\" 10x3 3\" 10x2 3\"      SAQ          Active HS stretch            March in supine  nv 10x2 nv      March in standin alt  10x   20x 20x nv   LAQ  1.5lb 10x2 BL 2lb 10x2 BL 2lb 10x2 BL      tailgaters  1' 1' 1'      Slant board  1'x2 1'x2 1' 1' 1' 1'   Standing hip 3 way  1.5lb 10x2 BL 1.5lb 10x2 BL 1.5lb 10x2 BL      Lat res walk          Lat step up          Leg ext machine     " "15lb 10x2 ea 15lb 10x2 ea 15lb 10x2 ea   Leg curl machine     10lb 10x2 ea 10lb 10x2 ea 15lb 10x2 ea   Knee ext w/ hip at 90 in supine          Step up     6\" x20 6\" x20  6\" x20   Step lat       6\" x20   Leg press     65lb 20x 65lb 20x 65lb 20x   NEUROMUSCULAR REEDUCATION           Tandem walk          Airex lat walk          SLS           Bosu lunge                                                                                                              THERAPEUTIC ACTIVITY          Patient education: pathoanatomy, nature of sxs, POC, HEP  JK NS       Bike for Knee ROm  8' L1 7' L2 7'L2 8' L1 8' L1 8' L1                       GAIT TRAINING                                                  MODALITIES                                                      "

## 2025-05-21 ENCOUNTER — APPOINTMENT (OUTPATIENT)
Dept: PHYSICAL THERAPY | Facility: CLINIC | Age: 65
End: 2025-05-21
Attending: FAMILY MEDICINE
Payer: MEDICARE

## 2025-05-28 ENCOUNTER — APPOINTMENT (OUTPATIENT)
Dept: PHYSICAL THERAPY | Facility: CLINIC | Age: 65
End: 2025-05-28
Attending: FAMILY MEDICINE
Payer: MEDICARE

## 2025-05-30 ENCOUNTER — APPOINTMENT (OUTPATIENT)
Dept: PHYSICAL THERAPY | Facility: CLINIC | Age: 65
End: 2025-05-30
Payer: MEDICARE

## 2025-06-02 ENCOUNTER — EVALUATION (OUTPATIENT)
Dept: PHYSICAL THERAPY | Facility: CLINIC | Age: 65
End: 2025-06-02
Attending: FAMILY MEDICINE
Payer: MEDICARE

## 2025-06-02 DIAGNOSIS — M17.11 PATELLOFEMORAL ARTHRITIS OF RIGHT KNEE: ICD-10-CM

## 2025-06-02 DIAGNOSIS — M62.838 MUSCLE SPASTICITY: ICD-10-CM

## 2025-06-02 DIAGNOSIS — R29.898 WEAKNESS OF RIGHT LEG: Primary | ICD-10-CM

## 2025-06-02 PROCEDURE — 97112 NEUROMUSCULAR REEDUCATION: CPT | Performed by: PHYSICAL THERAPIST

## 2025-06-02 PROCEDURE — 97140 MANUAL THERAPY 1/> REGIONS: CPT | Performed by: PHYSICAL THERAPIST

## 2025-06-02 PROCEDURE — 97110 THERAPEUTIC EXERCISES: CPT | Performed by: PHYSICAL THERAPIST

## 2025-06-02 NOTE — PROGRESS NOTES
PT Re-Evaluation     Today's date: 2025  Patient name: Analisa Reyez  : 1960  MRN: [id number]  Referring provider: Cecil Lubin  Dx:   Encounter Diagnosis     ICD-10-CM    1. Weakness of right leg  R29.898 Ambulatory referral to Physical Therapy      2. Muscle spasticity  M62.838 Ambulatory referral to Physical Therapy      3. Patellofemoral arthritis of right knee  M17.11 Ambulatory referral to Physical Therapy            Assessment: Analisa Reyez is a 64 y.o. female seen in outpatient physical therapy at [facility] with complaints of R knee pain, stiffness and weakness.  She has been treated for decreased range of motion, decreased strength, limited flexibility, poor postural awareness, poor body mechanics, altered gait pattern, poor balance, decreased tolerance to activity and decreased functional mobility due to Weakness of right leg  Muscle spasticity  Patellofemoral arthritis of right knee.  Re-evaluation was performed to assess if she would benefit from skilled PT services in order to address these deficits and reach maximum level of function.  Thank you for the referral!    Understanding of Dx/Px/POC: good     Prognosis: good    Goals  STG  1. Independent with HEP in 4 weeks    Goal met    2. Decrease pain at worst by 50% in 4 weeks  Goal met       LTG  1. Increase RLE strength in all planes to 5/5 in 8 weeks.       Good progress      2. Return to full, pain-free and unrestricted standing, walking and stair climbing in 8 weeks. Good progress        Plan  Patient would benefit from: continued skilled physical therapy  Planned modality interventions: thermotherapy: hydrocollator packs  Planned therapy interventions: neuromuscular re-education, manual therapy, therapeutic exercise and therapeutic activities  Frequency: 1x week  Duration in weeks: 4  Treatment plan discussed with: patient - she will be transitioning to independent gym activities in the next 4  weeks      Subjective Evaluation    History of Present Illness  Mechanism of injury: Pt reports chronic history R knee pain, stiffness and weakness which began after a bad fall many years ago. Pt states she just iced her knee, no surgery. Recent injection in R knee which has alleviated her pain, but she has begun to guard her knee with no flexion during gait from fear of hyper extension or giving out. No giving out reported since the shot.   Pain  Current pain ratin  At best pain ratin  At worst pain rating: 3       0/10  Location: anterolateral R knee  Quality: dull ache  Relieving factors: ice  Aggravating factors: walking, stair climbing and standing        Objective     Neurological Testing     Sensation     Knee   Left Knee   Intact: Light touch    Right Knee   Intact: light touch     Reflexes   Left   Patellar (L4): normal (2+)  Achilles (S1): normal (2+)    Right   Patellar (L4): normal (2+)  Achilles (S1): normal (2+)    Active Range of Motion     Right Knee   Flexion: 130 degrees     131 deg  Extension: 0 degrees     0 deg     Strength/Myotome Testing     Right Hip   Planes of Motion   Flexion: 4+  5   Extension: 4+  5   Abduction: 4+  5   Adduction: 4+  5    Right Knee   Flexion: 4  4+  Extension: 4  4+  Quadriceps contraction: fair  Good     Right Ankle/Foot   Dorsiflexion: 4+  5  Plantar flexion: 4+  5    Tests     Right Knee    Positive anterior drawer.    Negative   Negative anterior Lachman, lateral Evin, medial Evin, posterior Lachman, Thessaly's test at 5 degrees, Thessaly's test at 20 degrees, valgus stress test at 0 degrees, valgus stress test at 30 degrees, varus stress test at 0 degrees and varus stress test at 30 degrees.             Daily Treatment Diary     CO-MORBIDITIES: fear avoidant behavior   HEP ACCESS CODE:   FOTO Completed On: 2025, 2025    POC Expires Reeval for Medicare to be completed  Unit Limit Auth Expiration Date PT/OT/STVisit Limit   2025 By visit  "10 na na Bomn    Completed on visit                  Auth Status DATE 6/2   NA Visit # 11    Remaining    MANUAL THERAPY    R HS stretch    Knee PROM    FOTO NS   RE NS           THERAPEUTIC EXERCISE HEP    HR  Slt bd 20   TR  Slt bd 20   SLR 4 way     Clam      bridge     SAQ     Active HS stretch     March in supine     March in standin alt  nv   LAQ     tailgaters     Slant board  1'   Standing hip 3 way     Lat res walk     Lat step up     Leg ext machine  15lb 10x3 ea   Leg curl machine  15lb 10x3 ea   Knee ext w/ hip at 90 in supine     Step up   6\" x20   Step lat  6\" x20   Leg press  65lb 30x   NEUROMUSCULAR REEDUCATION      Tandem walk     Airex lat walk     SLS      Bosu lunge                                                       THERAPEUTIC ACTIVITY     Patient education: pathoanatomy, nature of sxs, POC, HEP  NS   Bike for Knee ROm  8' L1             GAIT TRAINING                         MODALITIES                  "

## 2025-06-04 ENCOUNTER — APPOINTMENT (OUTPATIENT)
Dept: PHYSICAL THERAPY | Facility: CLINIC | Age: 65
End: 2025-06-04
Attending: FAMILY MEDICINE
Payer: MEDICARE

## 2025-06-11 ENCOUNTER — APPOINTMENT (OUTPATIENT)
Dept: PHYSICAL THERAPY | Facility: CLINIC | Age: 65
End: 2025-06-11
Attending: FAMILY MEDICINE
Payer: MEDICARE

## 2025-06-17 ENCOUNTER — TELEPHONE (OUTPATIENT)
Age: 65
End: 2025-06-17

## 2025-06-18 ENCOUNTER — APPOINTMENT (OUTPATIENT)
Dept: PHYSICAL THERAPY | Facility: CLINIC | Age: 65
End: 2025-06-18
Attending: FAMILY MEDICINE
Payer: MEDICARE

## 2025-06-25 ENCOUNTER — APPOINTMENT (OUTPATIENT)
Dept: PHYSICAL THERAPY | Facility: CLINIC | Age: 65
End: 2025-06-25
Attending: FAMILY MEDICINE
Payer: MEDICARE

## 2025-08-18 ENCOUNTER — TELEPHONE (OUTPATIENT)
Dept: MAMMOGRAPHY | Facility: CLINIC | Age: 65
End: 2025-08-18